# Patient Record
Sex: FEMALE | Race: WHITE | NOT HISPANIC OR LATINO | URBAN - METROPOLITAN AREA
[De-identification: names, ages, dates, MRNs, and addresses within clinical notes are randomized per-mention and may not be internally consistent; named-entity substitution may affect disease eponyms.]

---

## 2024-02-14 ENCOUNTER — INPATIENT (INPATIENT)
Facility: HOSPITAL | Age: 84
LOS: 8 days | Discharge: NURSING, REHAB OR LONG-TERM CARE | DRG: 871 | End: 2024-02-23
Attending: INTERNAL MEDICINE | Admitting: INTERNAL MEDICINE
Payer: MEDICARE

## 2024-02-14 VITALS
RESPIRATION RATE: 17 BRPM | SYSTOLIC BLOOD PRESSURE: 88 MMHG | WEIGHT: 99.65 LBS | HEART RATE: 71 BPM | DIASTOLIC BLOOD PRESSURE: 55 MMHG | OXYGEN SATURATION: 100 % | HEIGHT: 63 IN

## 2024-02-14 DIAGNOSIS — Z29.9 ENCOUNTER FOR PROPHYLACTIC MEASURES, UNSPECIFIED: ICD-10-CM

## 2024-02-14 DIAGNOSIS — R41.82 ALTERED MENTAL STATUS, UNSPECIFIED: ICD-10-CM

## 2024-02-14 DIAGNOSIS — G93.41 METABOLIC ENCEPHALOPATHY: ICD-10-CM

## 2024-02-14 DIAGNOSIS — N39.0 URINARY TRACT INFECTION, SITE NOT SPECIFIED: ICD-10-CM

## 2024-02-14 DIAGNOSIS — E78.5 HYPERLIPIDEMIA, UNSPECIFIED: ICD-10-CM

## 2024-02-14 DIAGNOSIS — I10 ESSENTIAL (PRIMARY) HYPERTENSION: ICD-10-CM

## 2024-02-14 DIAGNOSIS — E83.52 HYPERCALCEMIA: ICD-10-CM

## 2024-02-14 LAB
ALBUMIN SERPL ELPH-MCNC: 2.8 G/DL — LOW (ref 3.5–5)
ALP SERPL-CCNC: 84 U/L — SIGNIFICANT CHANGE UP (ref 40–120)
ALT FLD-CCNC: 25 U/L DA — SIGNIFICANT CHANGE UP (ref 10–60)
ANION GAP SERPL CALC-SCNC: 9 MMOL/L — SIGNIFICANT CHANGE UP (ref 5–17)
APPEARANCE UR: ABNORMAL
APTT BLD: 29.7 SEC — SIGNIFICANT CHANGE UP (ref 24.5–35.6)
AST SERPL-CCNC: 42 U/L — HIGH (ref 10–40)
BACTERIA # UR AUTO: ABNORMAL /HPF
BASOPHILS # BLD AUTO: 0.02 K/UL — SIGNIFICANT CHANGE UP (ref 0–0.2)
BASOPHILS NFR BLD AUTO: 0.3 % — SIGNIFICANT CHANGE UP (ref 0–2)
BILIRUB SERPL-MCNC: 0.4 MG/DL — SIGNIFICANT CHANGE UP (ref 0.2–1.2)
BILIRUB UR-MCNC: NEGATIVE — SIGNIFICANT CHANGE UP
BUN SERPL-MCNC: 26 MG/DL — HIGH (ref 7–18)
CALCIUM SERPL-MCNC: 9.8 MG/DL — SIGNIFICANT CHANGE UP (ref 8.4–10.5)
CHLORIDE SERPL-SCNC: 103 MMOL/L — SIGNIFICANT CHANGE UP (ref 96–108)
CO2 SERPL-SCNC: 24 MMOL/L — SIGNIFICANT CHANGE UP (ref 22–31)
COLOR SPEC: YELLOW — SIGNIFICANT CHANGE UP
CREAT SERPL-MCNC: 1.58 MG/DL — HIGH (ref 0.5–1.3)
DIFF PNL FLD: NEGATIVE — SIGNIFICANT CHANGE UP
EGFR: 32 ML/MIN/1.73M2 — LOW
EOSINOPHIL # BLD AUTO: 0.01 K/UL — SIGNIFICANT CHANGE UP (ref 0–0.5)
EOSINOPHIL NFR BLD AUTO: 0.1 % — SIGNIFICANT CHANGE UP (ref 0–6)
EPI CELLS # UR: PRESENT
FLUAV AG NPH QL: SIGNIFICANT CHANGE UP
FLUBV AG NPH QL: SIGNIFICANT CHANGE UP
GAS PNL BLDA: SIGNIFICANT CHANGE UP
GLUCOSE SERPL-MCNC: 118 MG/DL — HIGH (ref 70–99)
GLUCOSE UR QL: NEGATIVE MG/DL — SIGNIFICANT CHANGE UP
HCT VFR BLD CALC: 35.2 % — SIGNIFICANT CHANGE UP (ref 34.5–45)
HGB BLD-MCNC: 11.1 G/DL — LOW (ref 11.5–15.5)
IMM GRANULOCYTES NFR BLD AUTO: 0.5 % — SIGNIFICANT CHANGE UP (ref 0–0.9)
INR BLD: 0.94 RATIO — SIGNIFICANT CHANGE UP (ref 0.85–1.18)
KETONES UR-MCNC: NEGATIVE MG/DL — SIGNIFICANT CHANGE UP
LACTATE SERPL-SCNC: 3 MMOL/L — HIGH (ref 0.7–2)
LEUKOCYTE ESTERASE UR-ACNC: ABNORMAL
LYMPHOCYTES # BLD AUTO: 0.61 K/UL — LOW (ref 1–3.3)
LYMPHOCYTES # BLD AUTO: 7.6 % — LOW (ref 13–44)
MCHC RBC-ENTMCNC: 29.5 PG — SIGNIFICANT CHANGE UP (ref 27–34)
MCHC RBC-ENTMCNC: 31.5 GM/DL — LOW (ref 32–36)
MCV RBC AUTO: 93.6 FL — SIGNIFICANT CHANGE UP (ref 80–100)
MONOCYTES # BLD AUTO: 0.46 K/UL — SIGNIFICANT CHANGE UP (ref 0–0.9)
MONOCYTES NFR BLD AUTO: 5.8 % — SIGNIFICANT CHANGE UP (ref 2–14)
NEUTROPHILS # BLD AUTO: 6.84 K/UL — SIGNIFICANT CHANGE UP (ref 1.8–7.4)
NEUTROPHILS NFR BLD AUTO: 85.7 % — HIGH (ref 43–77)
NITRITE UR-MCNC: NEGATIVE — SIGNIFICANT CHANGE UP
NRBC # BLD: 0 /100 WBCS — SIGNIFICANT CHANGE UP (ref 0–0)
PH UR: 7.5 — SIGNIFICANT CHANGE UP (ref 5–8)
PLATELET # BLD AUTO: 293 K/UL — SIGNIFICANT CHANGE UP (ref 150–400)
POTASSIUM SERPL-MCNC: 4.8 MMOL/L — SIGNIFICANT CHANGE UP (ref 3.5–5.3)
POTASSIUM SERPL-SCNC: 4.8 MMOL/L — SIGNIFICANT CHANGE UP (ref 3.5–5.3)
PROT SERPL-MCNC: 7.5 G/DL — SIGNIFICANT CHANGE UP (ref 6–8.3)
PROT UR-MCNC: 30 MG/DL
PROTHROM AB SERPL-ACNC: 10.7 SEC — SIGNIFICANT CHANGE UP (ref 9.5–13)
RBC # BLD: 3.76 M/UL — LOW (ref 3.8–5.2)
RBC # FLD: 13.6 % — SIGNIFICANT CHANGE UP (ref 10.3–14.5)
RBC CASTS # UR COMP ASSIST: 10 /HPF — HIGH (ref 0–4)
SARS-COV-2 RNA SPEC QL NAA+PROBE: SIGNIFICANT CHANGE UP
SODIUM SERPL-SCNC: 136 MMOL/L — SIGNIFICANT CHANGE UP (ref 135–145)
SP GR SPEC: 1.01 — SIGNIFICANT CHANGE UP (ref 1–1.03)
TROPONIN I, HIGH SENSITIVITY RESULT: 13.5 NG/L — SIGNIFICANT CHANGE UP
UROBILINOGEN FLD QL: 0.2 MG/DL — SIGNIFICANT CHANGE UP (ref 0.2–1)
WBC # BLD: 7.98 K/UL — SIGNIFICANT CHANGE UP (ref 3.8–10.5)
WBC # FLD AUTO: 7.98 K/UL — SIGNIFICANT CHANGE UP (ref 3.8–10.5)
WBC UR QL: 55 /HPF — HIGH (ref 0–5)

## 2024-02-14 PROCEDURE — 99285 EMERGENCY DEPT VISIT HI MDM: CPT

## 2024-02-14 PROCEDURE — 70450 CT HEAD/BRAIN W/O DYE: CPT | Mod: 26,MA

## 2024-02-14 PROCEDURE — 71045 X-RAY EXAM CHEST 1 VIEW: CPT | Mod: 26

## 2024-02-14 RX ORDER — BACITRACIN ZINC 500 UNIT/G
1 OINTMENT IN PACKET (EA) TOPICAL
Refills: 0 | DISCHARGE

## 2024-02-14 RX ORDER — SODIUM CHLORIDE 9 MG/ML
1000 INJECTION, SOLUTION INTRAVENOUS
Refills: 0 | Status: DISCONTINUED | OUTPATIENT
Start: 2024-02-14 | End: 2024-02-19

## 2024-02-14 RX ORDER — LATANOPROST 0.05 MG/ML
1 SOLUTION/ DROPS OPHTHALMIC; TOPICAL AT BEDTIME
Refills: 0 | Status: DISCONTINUED | OUTPATIENT
Start: 2024-02-14 | End: 2024-02-23

## 2024-02-14 RX ORDER — ACETAMINOPHEN 500 MG
650 TABLET ORAL ONCE
Refills: 0 | Status: DISCONTINUED | OUTPATIENT
Start: 2024-02-14 | End: 2024-02-14

## 2024-02-14 RX ORDER — VANCOMYCIN HCL 1 G
750 VIAL (EA) INTRAVENOUS ONCE
Refills: 0 | Status: COMPLETED | OUTPATIENT
Start: 2024-02-14 | End: 2024-02-14

## 2024-02-14 RX ORDER — ONDANSETRON 8 MG/1
4 TABLET, FILM COATED ORAL EVERY 8 HOURS
Refills: 0 | Status: DISCONTINUED | OUTPATIENT
Start: 2024-02-14 | End: 2024-02-23

## 2024-02-14 RX ORDER — SENNA PLUS 8.6 MG/1
2 TABLET ORAL AT BEDTIME
Refills: 0 | Status: DISCONTINUED | OUTPATIENT
Start: 2024-02-14 | End: 2024-02-23

## 2024-02-14 RX ORDER — CHOLECALCIFEROL (VITAMIN D3) 125 MCG
1000 CAPSULE ORAL DAILY
Refills: 0 | Status: DISCONTINUED | OUTPATIENT
Start: 2024-02-14 | End: 2024-02-23

## 2024-02-14 RX ORDER — SODIUM CHLORIDE 9 MG/ML
2500 INJECTION INTRAMUSCULAR; INTRAVENOUS; SUBCUTANEOUS ONCE
Refills: 0 | Status: COMPLETED | OUTPATIENT
Start: 2024-02-14 | End: 2024-02-14

## 2024-02-14 RX ORDER — ATORVASTATIN CALCIUM 80 MG/1
20 TABLET, FILM COATED ORAL AT BEDTIME
Refills: 0 | Status: DISCONTINUED | OUTPATIENT
Start: 2024-02-14 | End: 2024-02-23

## 2024-02-14 RX ORDER — POLYETHYLENE GLYCOL 3350 17 G/17G
17 POWDER, FOR SOLUTION ORAL
Refills: 0 | DISCHARGE

## 2024-02-14 RX ORDER — SODIUM CHLORIDE 9 MG/ML
1000 INJECTION, SOLUTION INTRAVENOUS
Refills: 0 | Status: DISCONTINUED | OUTPATIENT
Start: 2024-02-14 | End: 2024-02-14

## 2024-02-14 RX ORDER — SENNA PLUS 8.6 MG/1
2 TABLET ORAL
Refills: 0 | DISCHARGE

## 2024-02-14 RX ORDER — PIPERACILLIN AND TAZOBACTAM 4; .5 G/20ML; G/20ML
3.38 INJECTION, POWDER, LYOPHILIZED, FOR SOLUTION INTRAVENOUS ONCE
Refills: 0 | Status: COMPLETED | OUTPATIENT
Start: 2024-02-14 | End: 2024-02-14

## 2024-02-14 RX ORDER — AMLODIPINE BESYLATE 2.5 MG/1
1 TABLET ORAL
Refills: 0 | DISCHARGE

## 2024-02-14 RX ORDER — FOLIC ACID 0.8 MG
1 TABLET ORAL DAILY
Refills: 0 | Status: DISCONTINUED | OUTPATIENT
Start: 2024-02-14 | End: 2024-02-23

## 2024-02-14 RX ORDER — ACETAMINOPHEN 500 MG
650 TABLET ORAL EVERY 6 HOURS
Refills: 0 | Status: DISCONTINUED | OUTPATIENT
Start: 2024-02-14 | End: 2024-02-23

## 2024-02-14 RX ORDER — ENOXAPARIN SODIUM 100 MG/ML
30 INJECTION SUBCUTANEOUS EVERY 24 HOURS
Refills: 0 | Status: DISCONTINUED | OUTPATIENT
Start: 2024-02-14 | End: 2024-02-23

## 2024-02-14 RX ORDER — CEFTRIAXONE 500 MG/1
1000 INJECTION, POWDER, FOR SOLUTION INTRAMUSCULAR; INTRAVENOUS EVERY 24 HOURS
Refills: 0 | Status: COMPLETED | OUTPATIENT
Start: 2024-02-15 | End: 2024-02-17

## 2024-02-14 RX ORDER — BACITRACIN ZINC 500 UNIT/G
1 OINTMENT IN PACKET (EA) TOPICAL
Refills: 0 | Status: DISCONTINUED | OUTPATIENT
Start: 2024-02-14 | End: 2024-02-23

## 2024-02-14 RX ORDER — ATORVASTATIN CALCIUM 80 MG/1
1 TABLET, FILM COATED ORAL
Refills: 0 | DISCHARGE

## 2024-02-14 RX ORDER — ACETAMINOPHEN 500 MG
2 TABLET ORAL
Refills: 0 | DISCHARGE

## 2024-02-14 RX ORDER — POLYETHYLENE GLYCOL 3350 17 G/17G
17 POWDER, FOR SOLUTION ORAL DAILY
Refills: 0 | Status: DISCONTINUED | OUTPATIENT
Start: 2024-02-14 | End: 2024-02-23

## 2024-02-14 RX ADMIN — SODIUM CHLORIDE 2500 MILLILITER(S): 9 INJECTION INTRAMUSCULAR; INTRAVENOUS; SUBCUTANEOUS at 19:15

## 2024-02-14 RX ADMIN — PIPERACILLIN AND TAZOBACTAM 3.38 GRAM(S): 4; .5 INJECTION, POWDER, LYOPHILIZED, FOR SOLUTION INTRAVENOUS at 19:15

## 2024-02-14 RX ADMIN — SODIUM CHLORIDE 2500 MILLILITER(S): 9 INJECTION INTRAMUSCULAR; INTRAVENOUS; SUBCUTANEOUS at 17:06

## 2024-02-14 RX ADMIN — Medication 750 MILLIGRAM(S): at 19:15

## 2024-02-14 RX ADMIN — PIPERACILLIN AND TAZOBACTAM 200 GRAM(S): 4; .5 INJECTION, POWDER, LYOPHILIZED, FOR SOLUTION INTRAVENOUS at 17:06

## 2024-02-14 RX ADMIN — Medication 250 MILLIGRAM(S): at 17:50

## 2024-02-14 NOTE — ED ADULT NURSE NOTE - OBJECTIVE STATEMENT
pt is here for altered mental status.  BIBA, sending from NH for altered mental status, non-verbal status at this time, b/l lower arms bruises and skin tears from NH, redness to sacrum area,

## 2024-02-14 NOTE — H&P ADULT - PROBLEM SELECTOR PLAN 3
- Mildly elevated corrected calcium - 10.8.   - Start IV fluids.   - F/U PTH. - Mildly elevated corrected calcium - 10.8.   - Start IV fluids.   - F/U PTH, PTHrp, Vitamin D.

## 2024-02-14 NOTE — H&P ADULT - PROBLEM SELECTOR PLAN 2
- P/w lethargy and found to be minimally responsive.   - Presented with hypotension (88/55) which resolved with IV fluids.   - UA +ve.   - S/p zosyn and vancomycin in ED.   - s/p 2.5L in ED.   - Start ceftriaxone.   - C/w IV fluids.   - F/U Ucx.   - F/U Bcx. - P/w lethargy and found to be minimally responsive.   - Presented with hypotension (88/55) which resolved with IV fluids.   - UA +ve.   - S/p zosyn and vancomycin in ED.   - s/p 2.5L in ED.   - Start ceftriaxone.   - C/w IV fluids.   - F/U Ucx.   - F/U Bcx.  - Consulted ID - Dr Mejia

## 2024-02-14 NOTE — ED PROVIDER NOTE - OBJECTIVE STATEMENT
83 F PMHx Dementia BIBEMS from NH for AMS. 83-year-old female with a past medical history of dementia presents to the ED brought in by EMS for change in mental status.  Patient at nursing home had change in baseline normally spontaneously awake found to be somnolent not eating.  On arrival blood pressure 88/55 in the setting of altered mental status code sepsis activated.    .GENERALIZED APPEARANCE:  No obvious signs of acute distress.  Non-verbal due to AMS.   SKIN:  Warm, dry, normal color for race, no rashes, no cyanosis.  HEENMT: Atraumatic. PERRLA. EOMI. No JVD. No lymphadenopathy.  RESPIRATORY:  Clear to auscultation B/L, adequate air entry bilaterally, symmetrical chest expansion. No obvious respiratory distress.   CARDIOVASCULAR:  Regular rate, no obvious murmur.  GI:  Soft, non-tender, non-distended.  No rebound, no guarding.  EXTREMITIES:  No deformity, edema, or calf tenderness. Pulses intact x4.  NEURO: AAOx0. No focal deficits. Somnolent. Unresponsive with spontaneous respirations.

## 2024-02-14 NOTE — H&P ADULT - HISTORY OF PRESENT ILLNESS
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 dementia (AAOx1-2 at baseline) who was sent by NH for altered mental status. Patient is usually interactive and talking but this morning patient was found to be lethargic and minimally interactive. Due to this patient was sent to the hospital. Patient was seen at bedside but patient is minimally responsive and only making grunting sounds to painful stimuli. No ROS could be obtained from the patient due to altered mental status.  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 dementia (AAOx1-2 at baseline) who was sent by NH for altered mental status. Patient is usually interactive and talking but this morning patient was found to be lethargic and minimally interactive. Due to this patient was sent to the hospital. Patient was seen at bedside but patient is minimally responsive and only making grunting sounds to painful stimuli. No ROS could be obtained from the patient due to altered mental status.

## 2024-02-14 NOTE — ED PROVIDER NOTE - CLINICAL SUMMARY MEDICAL DECISION MAKING FREE TEXT BOX
This patient presents with altered mental status, concerning for _. Labs and exam were inconsistent with toxic metabolic etiologies such as electrolyte disturbances (Na/Ca), hypoglycemia, and uremia; acidosis states, infection (i.e. Sepsis). History and exam make toxidromes of intoxication or withdrawal, hypoxemia or hypercarbia, liver disease or failure causing hepatic encephalopathy, endocrine emergencies (hyper/hypothyroidism, adrenal insufficiency), seizure, trauma, intracranial bleeds or ischemic stroke less likely_.    Presume medical neurocognitive cause such as dementia, delirium, drug/alcohol induced.    Will obtain medical workup and discuss with social work to try to obtain collateral information.    Given History, Physical, and Workup there is no overt concern for a dangerous emergent cause such as, but not limited to, CNS infection, severe Toxidrome, severe metabolic derangement, or stroke.    Disposition: Admit; the patient is suffering altered mental status that is persistent and therefore they will be admitted.

## 2024-02-14 NOTE — H&P ADULT - NSHPPHYSICALEXAM_GEN_ALL_CORE
PHYSICAL EXAM:  GENERAL: lethargic, only making grunting sounds with painful stimuli.   HEAD:  Atraumatic, Normocephalic  EYES: constricted pupils that are minimally reactive.   NECK: Supple  CHEST/LUNG: Clear to auscultation bilaterally; No wheeze; No crackles; No accessory muscles used  HEART: Regular rate and rhythm; No murmurs;   ABDOMEN: Soft, Nontender, Nondistended; Bowel sounds present; No guarding  EXTREMITIES:  2+ Peripheral Pulses, No edema  PSYCH: AAOx0  NEUROLOGY: lethargic, only making grunting sounds with painful stimuli. Could not do thorough neuro exam as patient can not follow commands.   SKIN: No rashes or lesions

## 2024-02-14 NOTE — H&P ADULT - PROBLEM SELECTOR PLAN 1
- P/w lethargy and found to be minimally responsive.   - CT head: chronic ischemic changes with atrophy, most prevalent in the frontal temporal region.. No CT evidence of an acute infarct, hemorrhage, or mass.  - Normal Na, K, blood glucose and PCO2.   - Mildly elevated corrected calcium - 10.8.   - UA +ve.   - Likely AMS 2/2 to UTI vs hypercalcemia.   - C/w IV fluids, treat underlying infection.   - F/U Utox.   - Consult neurology in AM. - P/w lethargy and found to be minimally responsive.   - CT head: chronic ischemic changes with atrophy, most prevalent in the frontal temporal region.. No CT evidence of an acute infarct, hemorrhage, or mass.  - Normal Na, K, blood glucose and PCO2.   - Mildly elevated corrected calcium - 10.8.   - UA +ve.   - Likely AMS 2/2 to UTI vs hypercalcemia.   - C/w IV fluids, treat underlying infection.   - F/U Utox.   - Consulted neurology - Dr. Thornton

## 2024-02-14 NOTE — H&P ADULT - ASSESSMENT
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 dementia (AAOx1-2 at baseline) who was sent by NH for altered mental status. Patient tested positive for a UTI. Patient is being admitted for acute encephalopathy 2/2 to UTI.

## 2024-02-14 NOTE — H&P ADULT - PROBLEM SELECTOR PLAN 4
- Takes amlodipine at home.   - Will hold in setting of hypotension.   - Can resume once BP improves.

## 2024-02-15 LAB
24R-OH-CALCIDIOL SERPL-MCNC: 28.1 NG/ML — LOW (ref 30–80)
AMPHET UR-MCNC: NEGATIVE — SIGNIFICANT CHANGE UP
ANION GAP SERPL CALC-SCNC: 7 MMOL/L — SIGNIFICANT CHANGE UP (ref 5–17)
BARBITURATES UR SCN-MCNC: NEGATIVE — SIGNIFICANT CHANGE UP
BENZODIAZ UR-MCNC: NEGATIVE — SIGNIFICANT CHANGE UP
BUN SERPL-MCNC: 17 MG/DL — SIGNIFICANT CHANGE UP (ref 7–18)
CALCIUM SERPL-MCNC: 8.9 MG/DL — SIGNIFICANT CHANGE UP (ref 8.4–10.5)
CALCIUM SERPL-MCNC: 8.9 MG/DL — SIGNIFICANT CHANGE UP (ref 8.4–10.5)
CHLORIDE SERPL-SCNC: 115 MMOL/L — HIGH (ref 96–108)
CO2 SERPL-SCNC: 23 MMOL/L — SIGNIFICANT CHANGE UP (ref 22–31)
COCAINE METAB.OTHER UR-MCNC: NEGATIVE — SIGNIFICANT CHANGE UP
CREAT SERPL-MCNC: 0.89 MG/DL — SIGNIFICANT CHANGE UP (ref 0.5–1.3)
EGFR: 64 ML/MIN/1.73M2 — SIGNIFICANT CHANGE UP
GLUCOSE SERPL-MCNC: 92 MG/DL — SIGNIFICANT CHANGE UP (ref 70–99)
HCT VFR BLD CALC: 33.2 % — LOW (ref 34.5–45)
HGB BLD-MCNC: 10.2 G/DL — LOW (ref 11.5–15.5)
MAGNESIUM SERPL-MCNC: 2 MG/DL — SIGNIFICANT CHANGE UP (ref 1.6–2.6)
MCHC RBC-ENTMCNC: 29 PG — SIGNIFICANT CHANGE UP (ref 27–34)
MCHC RBC-ENTMCNC: 30.7 GM/DL — LOW (ref 32–36)
MCV RBC AUTO: 94.3 FL — SIGNIFICANT CHANGE UP (ref 80–100)
METHADONE UR-MCNC: NEGATIVE — SIGNIFICANT CHANGE UP
NRBC # BLD: 0 /100 WBCS — SIGNIFICANT CHANGE UP (ref 0–0)
OPIATES UR-MCNC: NEGATIVE — SIGNIFICANT CHANGE UP
PCP SPEC-MCNC: SIGNIFICANT CHANGE UP
PCP UR-MCNC: NEGATIVE — SIGNIFICANT CHANGE UP
PHOSPHATE SERPL-MCNC: 3.3 MG/DL — SIGNIFICANT CHANGE UP (ref 2.5–4.5)
PLATELET # BLD AUTO: 213 K/UL — SIGNIFICANT CHANGE UP (ref 150–400)
POTASSIUM SERPL-MCNC: 4 MMOL/L — SIGNIFICANT CHANGE UP (ref 3.5–5.3)
POTASSIUM SERPL-SCNC: 4 MMOL/L — SIGNIFICANT CHANGE UP (ref 3.5–5.3)
PTH-INTACT FLD-MCNC: 38 PG/ML — SIGNIFICANT CHANGE UP (ref 15–65)
RBC # BLD: 3.52 M/UL — LOW (ref 3.8–5.2)
RBC # FLD: 13.8 % — SIGNIFICANT CHANGE UP (ref 10.3–14.5)
SODIUM SERPL-SCNC: 145 MMOL/L — SIGNIFICANT CHANGE UP (ref 135–145)
THC UR QL: NEGATIVE — SIGNIFICANT CHANGE UP
VIT D25+D1,25 OH+D1,25 PNL SERPL-MCNC: 33.9 PG/ML — SIGNIFICANT CHANGE UP (ref 19.9–79.3)
WBC # BLD: 6.88 K/UL — SIGNIFICANT CHANGE UP (ref 3.8–10.5)
WBC # FLD AUTO: 6.88 K/UL — SIGNIFICANT CHANGE UP (ref 3.8–10.5)

## 2024-02-15 RX ORDER — AMLODIPINE BESYLATE 2.5 MG/1
10 TABLET ORAL DAILY
Refills: 0 | Status: DISCONTINUED | OUTPATIENT
Start: 2024-02-16 | End: 2024-02-23

## 2024-02-15 RX ORDER — AMLODIPINE BESYLATE 2.5 MG/1
10 TABLET ORAL ONCE
Refills: 0 | Status: COMPLETED | OUTPATIENT
Start: 2024-02-15 | End: 2024-02-15

## 2024-02-15 RX ADMIN — Medication 1 APPLICATION(S): at 05:30

## 2024-02-15 RX ADMIN — LATANOPROST 1 DROP(S): 0.05 SOLUTION/ DROPS OPHTHALMIC; TOPICAL at 22:07

## 2024-02-15 RX ADMIN — Medication 1 APPLICATION(S): at 19:18

## 2024-02-15 RX ADMIN — SODIUM CHLORIDE 100 MILLILITER(S): 9 INJECTION, SOLUTION INTRAVENOUS at 06:01

## 2024-02-15 RX ADMIN — ATORVASTATIN CALCIUM 20 MILLIGRAM(S): 80 TABLET, FILM COATED ORAL at 01:05

## 2024-02-15 RX ADMIN — SENNA PLUS 2 TABLET(S): 8.6 TABLET ORAL at 01:05

## 2024-02-15 RX ADMIN — CEFTRIAXONE 100 MILLIGRAM(S): 500 INJECTION, POWDER, FOR SOLUTION INTRAMUSCULAR; INTRAVENOUS at 06:09

## 2024-02-15 RX ADMIN — ENOXAPARIN SODIUM 30 MILLIGRAM(S): 100 INJECTION SUBCUTANEOUS at 05:30

## 2024-02-15 NOTE — ADVANCED PRACTICE NURSE CONSULT - ASSESSMENT
This is a 83yr old female patient admitted for Altered Mental Status, presenting with the following:  -There is a Stage 1 Pressure Injury to the Bilateral Heels, as evident by non-blanchable erythema  -There is a Stage 2 Pressure Injury to the Coccyx (1cm x 0.5cm x 0.1cm) with pink tissue and scant drainage  -There is a Skin Tear (x2) to the R. Upper Extremity with red tissue, drainage, and surrounding tissue ecchymosis  -There is evidence of Bilateral Upper Extremity ecchymosis

## 2024-02-15 NOTE — PROGRESS NOTE ADULT - PROBLEM SELECTOR PLAN 1
- P/w lethargy and found to be minimally responsive.   - CT head: chronic ischemic changes with atrophy, most prevalent in the frontal temporal region.. No CT evidence of an acute infarct, hemorrhage, or mass.  - Normal Na, K, blood glucose and PCO2.   - Mildly elevated corrected calcium - 10.8.   - UA +ve.   - Likely AMS 2/2 to UTI vs hypercalcemia.   - C/w IV fluids, treat underlying infection.   neurology - Dr. Thornton

## 2024-02-15 NOTE — PATIENT PROFILE ADULT - FALL HARM RISK - HARM RISK INTERVENTIONS
Assistance with ambulation/Assistance OOB with selected safe patient handling equipment/Communicate Risk of Fall with Harm to all staff/Monitor for mental status changes/Monitor gait and stability/Provide patient with walking aids - walker, cane, crutches/Reinforce activity limits and safety measures with patient and family/Reorient to person, place and time as needed/Review medications for side effects contributing to fall risk/Sit up slowly, dangle for a short time, stand at bedside before walking/Tailored Fall Risk Interventions/Toileting schedule using arm’s reach rule for commode and bathroom/Use of alarms - bed, chair and/or voice tab/Visual Cue: Yellow wristband and red socks/Bed in lowest position, wheels locked, appropriate side rails in place/Call bell, personal items and telephone in reach/Instruct patient to call for assistance before getting out of bed or chair/Non-slip footwear when patient is out of bed/Branch to call system/Physically safe environment - no spills, clutter or unnecessary equipment/Purposeful Proactive Rounding/Room/bathroom lighting operational, light cord in reach

## 2024-02-15 NOTE — ADVANCED PRACTICE NURSE CONSULT - RECOMMEDATIONS
-Clean all wounds with normal saline and apply skin prep to the surrounding skin  -Apply Xeroform gauze to the R. Upper Extremity wounds, cover with an ABD pad, wrap with Kerlex, and secure with tape Daily PRN  -Apply a Foam dressing to the Coccyx area Q 72hrs PRN  -Elevate/float the patients heels using heel protectors and reposition the patient Q 2hrs using wedges or pillows

## 2024-02-15 NOTE — PATIENT PROFILE ADULT - INFORMATION COULD NOT BE OBTAINED DETAILS
· Blood pressure well controlled  · Continue metoprolol  · Hold lisinopril  · Continue to monitor AMS

## 2024-02-15 NOTE — PROGRESS NOTE ADULT - SUBJECTIVE AND OBJECTIVE BOX
NP Note discussed with  Primary Attending    Patient is a 83y old  Female who presents with a chief complaint of AMS. (14 Feb 2024 21:33)      INTERVAL HPI/OVERNIGHT EVENTS: no new complaints    MEDICATIONS  (STANDING):  atorvastatin 20 milliGRAM(s) Oral at bedtime  bacitracin   Ointment 1 Application(s) Topical two times a day  cefTRIAXone   IVPB 1000 milliGRAM(s) IV Intermittent every 24 hours  cholecalciferol 1000 Unit(s) Oral daily  enoxaparin Injectable 30 milliGRAM(s) SubCutaneous every 24 hours  folic acid 1 milliGRAM(s) Oral daily  lactated ringers. 1000 milliLiter(s) (100 mL/Hr) IV Continuous <Continuous>  latanoprost 0.005% Ophthalmic Solution 1 Drop(s) Both EYES at bedtime  multivitamin 1 Tablet(s) Oral daily  polyethylene glycol 3350 17 Gram(s) Oral daily  senna 2 Tablet(s) Oral at bedtime    MEDICATIONS  (PRN):  acetaminophen     Tablet .. 650 milliGRAM(s) Oral every 6 hours PRN Temp greater or equal to 38C (100.4F), Mild Pain (1 - 3)  ondansetron Injectable 4 milliGRAM(s) IV Push every 8 hours PRN Nausea and/or Vomiting      __________________________________________________  REVIEW OF SYSTEMS:    CONSTITUTIONAL: No fever,   EYES: no acute visual disturbances  NECK: No pain or stiffness  RESPIRATORY: No cough; No shortness of breath  CARDIOVASCULAR: No chest pain, no palpitations  GASTROINTESTINAL: No pain. No nausea or vomiting; No diarrhea   NEUROLOGICAL: No headache or numbness, no tremors  MUSCULOSKELETAL: No joint pain, no muscle pain  GENITOURINARY: no dysuria, no frequency, no hesitancy  PSYCHIATRY: no depression , no anxiety  ALL OTHER  ROS negative        Vital Signs Last 24 Hrs  T(C): 37.1 (15 Feb 2024 07:51), Max: 37.1 (14 Feb 2024 18:35)  T(F): 98.8 (15 Feb 2024 07:51), Max: 98.8 (14 Feb 2024 18:35)  HR: 74 (15 Feb 2024 07:51) (71 - 93)  BP: 128/60 (15 Feb 2024 07:51) (88/55 - 128/60)  BP(mean): --  RR: 16 (15 Feb 2024 07:51) (14 - 18)  SpO2: 100% (15 Feb 2024 07:51) (100% - 100%)    Parameters below as of 15 Feb 2024 07:51  Patient On (Oxygen Delivery Method): nasal cannula  O2 Flow (L/min): 3      ________________________________________________  PHYSICAL EXAM:  GENERAL: NAD  HEENT: Normocephalic;  conjunctivae and sclerae clear; moist mucous membranes;   NECK : supple  CHEST/LUNG: Clear to auscultation bilaterally with good air entry   HEART: S1 S2  regular; no murmurs, gallops or rubs  ABDOMEN: Soft, Nontender, Nondistended; Bowel sounds present  EXTREMITIES: no cyanosis; no edema; no calf tenderness  SKIN: warm and dry; no rash  NERVOUS SYSTEM:  Awake and alert; Oriented  to place, person and time ; no new deficits    _________________________________________________  LABS:                        10.2   6.88  )-----------( 213      ( 15 Feb 2024 06:10 )             33.2     02-15    145  |  115<H>  |  17  ----------------------------<  92  4.0   |  23  |  0.89    Ca    8.9      15 Feb 2024 06:10  Phos  3.3     02-15  Mg     2.0     02-15    TPro  7.5  /  Alb  2.8<L>  /  TBili  0.4  /  DBili  x   /  AST  42<H>  /  ALT  25  /  AlkPhos  84  02-14    PT/INR - ( 14 Feb 2024 16:30 )   PT: 10.7 sec;   INR: 0.94 ratio         PTT - ( 14 Feb 2024 16:30 )  PTT:29.7 sec  Urinalysis Basic - ( 15 Feb 2024 06:10 )    Color: x / Appearance: x / SG: x / pH: x  Gluc: 92 mg/dL / Ketone: x  / Bili: x / Urobili: x   Blood: x / Protein: x / Nitrite: x   Leuk Esterase: x / RBC: x / WBC x   Sq Epi: x / Non Sq Epi: x / Bacteria: x      CAPILLARY BLOOD GLUCOSE      POCT Blood Glucose.: 162 mg/dL (14 Feb 2024 16:34)        RADIOLOGY & ADDITIONAL TESTS:    Imaging  Reviewed:  YES/NO    Consultant(s) Notes Reviewed:   YES/ No      Plan of care was discussed with patient and /or primary care giver; all questions and concerns were addressed  NP Note discussed with  Primary Attending    Patient is a 83y old  Female who presents with a chief complaint of AMS. (14 Feb 2024 21:33)      INTERVAL HPI/OVERNIGHT EVENTS: no new complaints    MEDICATIONS  (STANDING):  atorvastatin 20 milliGRAM(s) Oral at bedtime  bacitracin   Ointment 1 Application(s) Topical two times a day  cefTRIAXone   IVPB 1000 milliGRAM(s) IV Intermittent every 24 hours  cholecalciferol 1000 Unit(s) Oral daily  enoxaparin Injectable 30 milliGRAM(s) SubCutaneous every 24 hours  folic acid 1 milliGRAM(s) Oral daily  lactated ringers. 1000 milliLiter(s) (100 mL/Hr) IV Continuous <Continuous>  latanoprost 0.005% Ophthalmic Solution 1 Drop(s) Both EYES at bedtime  multivitamin 1 Tablet(s) Oral daily  polyethylene glycol 3350 17 Gram(s) Oral daily  senna 2 Tablet(s) Oral at bedtime    MEDICATIONS  (PRN):  acetaminophen     Tablet .. 650 milliGRAM(s) Oral every 6 hours PRN Temp greater or equal to 38C (100.4F), Mild Pain (1 - 3)  ondansetron Injectable 4 milliGRAM(s) IV Push every 8 hours PRN Nausea and/or Vomiting      __________________________________________________  REVIEW OF SYSTEMS:    poor historian unable to assess      Vital Signs Last 24 Hrs  T(C): 37.1 (15 Feb 2024 07:51), Max: 37.1 (14 Feb 2024 18:35)  T(F): 98.8 (15 Feb 2024 07:51), Max: 98.8 (14 Feb 2024 18:35)  HR: 74 (15 Feb 2024 07:51) (71 - 93)  BP: 128/60 (15 Feb 2024 07:51) (88/55 - 128/60)  BP(mean): --  RR: 16 (15 Feb 2024 07:51) (14 - 18)  SpO2: 100% (15 Feb 2024 07:51) (100% - 100%)    Parameters below as of 15 Feb 2024 07:51  Patient On (Oxygen Delivery Method): nasal cannula  O2 Flow (L/min): 3      ________________________________________________  PHYSICAL EXAM:  GENERAL: NAD  HEENT: Normocephalic;  conjunctivae and sclerae clear; moist mucous membranes;   NECK : supple  CHEST/LUNG: bibasilar crackles, mild, with good air entry   HEART: S1 S2  regular; no murmurs, gallops or rubs  ABDOMEN: Soft, Nontender, Nondistended; Bowel sounds present  EXTREMITIES: no cyanosis; no edema; no calf tenderness  SKIN: pressure injury stage 1 bilateral heel, stage 2 coccyx, skin tear  NERVOUS SYSTEM:  Awake and alert; Oriented  to place, confused    _________________________________________________  LABS:                        10.2   6.88  )-----------( 213      ( 15 Feb 2024 06:10 )             33.2     02-15    145  |  115<H>  |  17  ----------------------------<  92  4.0   |  23  |  0.89    Ca    8.9      15 Feb 2024 06:10  Phos  3.3     02-15  Mg     2.0     02-15    TPro  7.5  /  Alb  2.8<L>  /  TBili  0.4  /  DBili  x   /  AST  42<H>  /  ALT  25  /  AlkPhos  84  02-14    PT/INR - ( 14 Feb 2024 16:30 )   PT: 10.7 sec;   INR: 0.94 ratio         PTT - ( 14 Feb 2024 16:30 )  PTT:29.7 sec  Urinalysis Basic - ( 15 Feb 2024 06:10 )    Color: x / Appearance: x / SG: x / pH: x  Gluc: 92 mg/dL / Ketone: x  / Bili: x / Urobili: x   Blood: x / Protein: x / Nitrite: x   Leuk Esterase: x / RBC: x / WBC x   Sq Epi: x / Non Sq Epi: x / Bacteria: x      CAPILLARY BLOOD GLUCOSE      POCT Blood Glucose.: 162 mg/dL (14 Feb 2024 16:34)        RADIOLOGY & ADDITIONAL TESTS:  < from: CT Head No Cont (02.14.24 @ 17:29) >  ACC: [id number] EXAM:  CT BRAIN   ORDERED BY: MAX LAZARUS     PROCEDURE DATE:  02/14/2024          INTERPRETATION:  INDICATION:  Altered mental status.  TECHNIQUE:  A non contrast thin section axial CT study of the brain was   performed from skull base to vertex. Coronal and sagittal reformations   were generated from the axial data.  COMPARISON EXAMINATION:  No prior    FINDINGS:    HEMISPHERES:  Chronic ischemic changes are noted in the white matter of   both hemispheres with atrophy. This is more prominent on the left. There   is no CT evidence of an acute infarct, hemorrhage, or mass. There is   moderate to severe temporal lobe atrophy with moderate frontal volume   loss.  VENTRICLES:  Midline with ex vacuo enlargement  POSTERIOR FOSSA:  The brain stem and cerebellum are unremarkable.  No CP   angle lesion noted.  EXTRACEREBRAL SPACES:  No subdural or epidural collections are noted.  SKULL BASE AND CALVARIUM:  Appears intact.  No fracture or destructive   lesion is identified.  SINUSES AND MASTOIDS:  Clear.  MISCELLANEOUS:  No orbital or suprasellar abnormality noted.    IMPRESSION:    1)  chronic ischemic changes with atrophy, most prevalent in the frontal   temporal region.. No CT evidence of an acute infarct, hemorrhage, or mass.  2)  clear sinuses and mastoids..    Follow-up MR imaging of the brain may be considered for further   assessment.    --- End of Report ---      < end of copied text >  < from: Xray Chest 1 View- PORTABLE-Urgent (02.14.24 @ 16:52) >  ACC: [id number] EXAM:  XR CHEST PORTABLE URGENT 1V   ORDERED BY: MAX   LAZARUS     PROCEDURE DATE:  02/14/2024          INTERPRETATION:  Portable AP chest radiographs    COMPARISON:  NONE.    CLINICAL INFORMATION: Sepsis.    FINDINGS:  CATHETERS AND TUBES: None    PULMONARY: The airway is midline.  There are no airspace consolidations or radiographic evidence of   pulmonary nodules..  No pleural effusion or pneumothorax.    HEART/VASCULAR: The heart size and mediastinum configuration are within   the limits of normal.    BONES: The visualized osseous thorax is intact.    IMPRESSION:    No radiographic evidence of active chest disease..    --- End of Report ---    < end of copied text >    Imaging  Reviewed:  YES    Consultant(s) Notes Reviewed:   YES    Plan of care was discussed with patient and /or primary care giver; all questions and concerns were addressed

## 2024-02-15 NOTE — PROGRESS NOTE ADULT - SUBJECTIVE AND OBJECTIVE BOX
Patient is a 83y old  Female who presents with a chief complaint of AMS. (15 Feb 2024 11:02)    PATIENT IS SEEN AND EXAMINED IN MEDICAL FLOOR.      ALLERGIES:  No Known Allergies      VITALS:    Vital Signs Last 24 Hrs  T(C): 36.7 (15 Feb 2024 21:30), Max: 37.1 (15 Feb 2024 07:51)  T(F): 98 (15 Feb 2024 21:30), Max: 98.8 (15 Feb 2024 07:51)  HR: 125 (15 Feb 2024 21:30) (74 - 125)  BP: 196/89 (15 Feb 2024 21:30) (110/78 - 196/89)  BP(mean): --  RR: 16 (15 Feb 2024 21:30) (14 - 18)  SpO2: 97% (15 Feb 2024 21:30) (93% - 100%)    Parameters below as of 15 Feb 2024 21:30  Patient On (Oxygen Delivery Method): nasal cannula  O2 Flow (L/min): 3      LABS:    CBC Full  -  ( 15 Feb 2024 06:10 )  WBC Count : 6.88 K/uL  RBC Count : 3.52 M/uL  Hemoglobin : 10.2 g/dL  Hematocrit : 33.2 %  Platelet Count - Automated : 213 K/uL  Mean Cell Volume : 94.3 fl  Mean Cell Hemoglobin : 29.0 pg  Mean Cell Hemoglobin Concentration : 30.7 gm/dL  Auto Neutrophil # : x  Auto Lymphocyte # : x  Auto Monocyte # : x  Auto Eosinophil # : x  Auto Basophil # : x  Auto Neutrophil % : x  Auto Lymphocyte % : x  Auto Monocyte % : x  Auto Eosinophil % : x  Auto Basophil % : x    PT/INR - ( 14 Feb 2024 16:30 )   PT: 10.7 sec;   INR: 0.94 ratio         PTT - ( 14 Feb 2024 16:30 )  PTT:29.7 sec  02-15    145  |  115<H>  |  17  ----------------------------<  92  4.0   |  23  |  0.89    Ca    8.9      15 Feb 2024 06:10  Phos  3.3     02-15  Mg     2.0     02-15    TPro  7.5  /  Alb  2.8<L>  /  TBili  0.4  /  DBili  x   /  AST  42<H>  /  ALT  25  /  AlkPhos  84  02-14    CAPILLARY BLOOD GLUCOSE            LIVER FUNCTIONS - ( 14 Feb 2024 16:30 )  Alb: 2.8 g/dL / Pro: 7.5 g/dL / ALK PHOS: 84 U/L / ALT: 25 U/L DA / AST: 42 U/L / GGT: x           Creatinine Trend: 0.89<--, 1.58<--  I&O's Summary      ABG - ( 14 Feb 2024 21:43 )  pH, Arterial: 7.41  pH, Blood: x     /  pCO2: 31    /  pO2: 125   / HCO3: 20    / Base Excess: -4.3  /  SaO2: 99                  .Blood Blood-Peripheral  02-14 @ 16:45   No growth at 24 hours  --  --      .Blood Blood-Peripheral  02-14 @ 16:30   No growth at 24 hours  --  --          MEDICATIONS:    MEDICATIONS  (STANDING):  amLODIPine   Tablet 10 milliGRAM(s) Oral once  atorvastatin 20 milliGRAM(s) Oral at bedtime  bacitracin   Ointment 1 Application(s) Topical two times a day  cefTRIAXone   IVPB 1000 milliGRAM(s) IV Intermittent every 24 hours  cholecalciferol 1000 Unit(s) Oral daily  enoxaparin Injectable 30 milliGRAM(s) SubCutaneous every 24 hours  folic acid 1 milliGRAM(s) Oral daily  lactated ringers. 1000 milliLiter(s) (100 mL/Hr) IV Continuous <Continuous>  latanoprost 0.005% Ophthalmic Solution 1 Drop(s) Both EYES at bedtime  multivitamin 1 Tablet(s) Oral daily  polyethylene glycol 3350 17 Gram(s) Oral daily  senna 2 Tablet(s) Oral at bedtime      MEDICATIONS  (PRN):  acetaminophen     Tablet .. 650 milliGRAM(s) Oral every 6 hours PRN Temp greater or equal to 38C (100.4F), Mild Pain (1 - 3)  ondansetron Injectable 4 milliGRAM(s) IV Push every 8 hours PRN Nausea and/or Vomiting      REVIEW OF SYSTEMS:                           ALL ROS DONE [ X   ]    CONSTITUTIONAL:  LETHARGIC [   ], FEVER [   ], UNRESPONSIVE [   ]  CVS:  CP  [   ], SOB, [   ], PALPITATIONS [   ], DIZZYNESS [   ]  RS: COUGH [   ], SPUTUM [   ]  GI: ABDOMINAL PAIN [   ], NAUSEA [   ], VOMITINGS [   ], DIARRHEA [   ], CONSTIPATION [   ]  :  DYSURIA [   ], NOCTURIA [   ], INCREASED FREQUENCY [   ], DRIBLING [   ],  SKELETAL: PAINFUL JOINTS [   ], SWOLLEN JOINTS [   ], NECK ACHE [   ], LOW BACK ACHE [   ],  SKIN : ULCERS [   ], RASH [   ], ITCHING [   ]  CNS: HEAD ACHE [   ], DOUBLE VISION [   ], BLURRED VISION [   ], AMS / CONFUSION [   ], SEIZURES [   ], WEAKNESS [   ],TINGLING / NUMBNESS [   ]    PHYSICAL EXAMINATION:  GENERAL APPEARANCE: NO DISTRESS  HEENT:  NO PALLOR, NO  JVD,  NO   NODES, NECK SUPPLE  CVS: S1 +, S2 +,   RS: AEEB,  OCCASIONAL  RALES +,   NO RONCHI  ABD: SOFT, NT, NO, BS +  EXT: NO PE  SKIN: WARM,   SKELETAL:  ROM ACCEPTABLE [ WHEN EXAMINER MOVES ARMS/LEGS, BUT IS NOT CONSISTENTLY PARTICIPATING IN EXAM - ?DUE TO DEMENTIA]  CNS:  AAO X 1    RADIOLOGY :    RADIOLOGY AND READINGS REVIEWED    ASSESSMENT :     Altered mental status    Hypertension    Hyperlipidemia    Dementia    Pulmonary embolism        PLAN:  HPI:  Patient is a 84 y/o F who is legally blind and totally dependent for ADLS with PMH of HTN, HLD, PE, PAD, CVA with residual dysphagia?. dementia (AAOx1-2 at baseline) who was sent by NH for altered mental status. Patient is usually interactive and talking but this morning patient was found to be lethargic and minimally interactive. Due to this patient was sent to the hospital. Patient was seen at bedside but patient is minimally responsive and only making grunting sounds to painful stimuli. No ROS could be obtained from the patient due to altered mental status.    (14 Feb 2024 21:33)    # CASE D/W  AT BEDSIDE    # SEPSIS S/T UTI  - PLACED ON ROCEPHIN, F/U BCX AND UCX  - ID CONSULT    # ACUTE ENCEPHALOPATHY ON CHRONIC DEMENTIA  - NOTED CT HEAD  - NOTED UTOX, ABG  - NEUROLOGY CONSULT    # NOAH  - IMPROVED S/P IVF    # AMBULATORY DYSFUNCTION S/T OA, OP, HX OF ?CVA  - F/U PT EVAL    # VITAMIN D DEFICIENCY  - REPLETE WITH SUPPLEMENT    # ? HX OF CVA  # ? HX OF PE    # HTN  # HLD  # LEGALLY BLIND, GLAUCOMA  # GI AND DVT PPX     Patient is a 83y old  Female who presents with a chief complaint of AMS. (15 Feb 2024 11:02)    PATIENT IS SEEN AND EXAMINED EARLIER ON MEDICAL FLOOR.      ALLERGIES:  No Known Allergies      VITALS:    Vital Signs Last 24 Hrs  T(C): 36.7 (15 Feb 2024 21:30), Max: 37.1 (15 Feb 2024 07:51)  T(F): 98 (15 Feb 2024 21:30), Max: 98.8 (15 Feb 2024 07:51)  HR: 125 (15 Feb 2024 21:30) (74 - 125)  BP: 196/89 (15 Feb 2024 21:30) (110/78 - 196/89)  BP(mean): --  RR: 16 (15 Feb 2024 21:30) (14 - 18)  SpO2: 97% (15 Feb 2024 21:30) (93% - 100%)    Parameters below as of 15 Feb 2024 21:30  Patient On (Oxygen Delivery Method): nasal cannula  O2 Flow (L/min): 3      LABS:    CBC Full  -  ( 15 Feb 2024 06:10 )  WBC Count : 6.88 K/uL  RBC Count : 3.52 M/uL  Hemoglobin : 10.2 g/dL  Hematocrit : 33.2 %  Platelet Count - Automated : 213 K/uL  Mean Cell Volume : 94.3 fl  Mean Cell Hemoglobin : 29.0 pg  Mean Cell Hemoglobin Concentration : 30.7 gm/dL  Auto Neutrophil # : x  Auto Lymphocyte # : x  Auto Monocyte # : x  Auto Eosinophil # : x  Auto Basophil # : x  Auto Neutrophil % : x  Auto Lymphocyte % : x  Auto Monocyte % : x  Auto Eosinophil % : x  Auto Basophil % : x    PT/INR - ( 14 Feb 2024 16:30 )   PT: 10.7 sec;   INR: 0.94 ratio         PTT - ( 14 Feb 2024 16:30 )  PTT:29.7 sec  02-15    145  |  115<H>  |  17  ----------------------------<  92  4.0   |  23  |  0.89    Ca    8.9      15 Feb 2024 06:10  Phos  3.3     02-15  Mg     2.0     02-15    TPro  7.5  /  Alb  2.8<L>  /  TBili  0.4  /  DBili  x   /  AST  42<H>  /  ALT  25  /  AlkPhos  84  02-14    CAPILLARY BLOOD GLUCOSE            LIVER FUNCTIONS - ( 14 Feb 2024 16:30 )  Alb: 2.8 g/dL / Pro: 7.5 g/dL / ALK PHOS: 84 U/L / ALT: 25 U/L DA / AST: 42 U/L / GGT: x           Creatinine Trend: 0.89<--, 1.58<--  I&O's Summary      ABG - ( 14 Feb 2024 21:43 )  pH, Arterial: 7.41  pH, Blood: x     /  pCO2: 31    /  pO2: 125   / HCO3: 20    / Base Excess: -4.3  /  SaO2: 99                  .Blood Blood-Peripheral  02-14 @ 16:45   No growth at 24 hours  --  --      .Blood Blood-Peripheral  02-14 @ 16:30   No growth at 24 hours  --  --          MEDICATIONS:    MEDICATIONS  (STANDING):  amLODIPine   Tablet 10 milliGRAM(s) Oral once  atorvastatin 20 milliGRAM(s) Oral at bedtime  bacitracin   Ointment 1 Application(s) Topical two times a day  cefTRIAXone   IVPB 1000 milliGRAM(s) IV Intermittent every 24 hours  cholecalciferol 1000 Unit(s) Oral daily  enoxaparin Injectable 30 milliGRAM(s) SubCutaneous every 24 hours  folic acid 1 milliGRAM(s) Oral daily  lactated ringers. 1000 milliLiter(s) (100 mL/Hr) IV Continuous <Continuous>  latanoprost 0.005% Ophthalmic Solution 1 Drop(s) Both EYES at bedtime  multivitamin 1 Tablet(s) Oral daily  polyethylene glycol 3350 17 Gram(s) Oral daily  senna 2 Tablet(s) Oral at bedtime      MEDICATIONS  (PRN):  acetaminophen     Tablet .. 650 milliGRAM(s) Oral every 6 hours PRN Temp greater or equal to 38C (100.4F), Mild Pain (1 - 3)  ondansetron Injectable 4 milliGRAM(s) IV Push every 8 hours PRN Nausea and/or Vomiting      REVIEW OF SYSTEMS:                           ALL ROS DONE [ X   ]    CONSTITUTIONAL:  LETHARGIC [   ], FEVER [   ], UNRESPONSIVE [   ]  CVS:  CP  [   ], SOB, [   ], PALPITATIONS [   ], DIZZYNESS [   ]  RS: COUGH [   ], SPUTUM [   ]  GI: ABDOMINAL PAIN [   ], NAUSEA [   ], VOMITINGS [   ], DIARRHEA [   ], CONSTIPATION [   ]  :  DYSURIA [   ], NOCTURIA [   ], INCREASED FREQUENCY [   ], DRIBLING [   ],  SKELETAL: PAINFUL JOINTS [   ], SWOLLEN JOINTS [   ], NECK ACHE [   ], LOW BACK ACHE [   ],  SKIN : ULCERS [   ], RASH [   ], ITCHING [   ]  CNS: HEAD ACHE [   ], DOUBLE VISION [   ], BLURRED VISION [   ], AMS / CONFUSION [   ], SEIZURES [   ], WEAKNESS [   ],TINGLING / NUMBNESS [   ]    PHYSICAL EXAMINATION:  GENERAL APPEARANCE: NO DISTRESS  HEENT:  NO PALLOR, NO  JVD,  NO   NODES, NECK SUPPLE  CVS: S1 +, S2 +,   RS: AEEB,  OCCASIONAL  RALES +,   NO RONCHI  ABD: SOFT, NT, NO, BS +  EXT: NO PE  SKIN: WARM,   SKELETAL:  ROM ACCEPTABLE [ WHEN EXAMINER MOVES ARMS/LEGS, BUT IS NOT CONSISTENTLY PARTICIPATING IN EXAM - ?DUE TO DEMENTIA]  CNS:  AAO X 1    RADIOLOGY :    RADIOLOGY AND READINGS REVIEWED    ASSESSMENT :     Altered mental status    Hypertension    Hyperlipidemia    Dementia    Pulmonary embolism        PLAN:  HPI:  Patient is a 82 y/o F who is legally blind and totally dependent for ADLS with PMH of HTN, HLD, PE, PAD, CVA with residual dysphagia?. dementia (AAOx1-2 at baseline) who was sent by NH for altered mental status. Patient is usually interactive and talking but this morning patient was found to be lethargic and minimally interactive. Due to this patient was sent to the hospital. Patient was seen at bedside but patient is minimally responsive and only making grunting sounds to painful stimuli. No ROS could be obtained from the patient due to altered mental status.    (14 Feb 2024 21:33)    # CASE D/W  AT BEDSIDE    # SEPSIS S/T UTI  - PLACED ON ROCEPHIN, F/U BCX AND UCX  - ID CONSULT    # ACUTE ENCEPHALOPATHY ON CHRONIC DEMENTIA  - NOTED CT HEAD  - NOTED UTOX, ABG  - NEUROLOGY CONSULT    # NOAH  - IMPROVED S/P IVF    # AMBULATORY DYSFUNCTION S/T OA, OP, HX OF ?CVA  - F/U PT EVAL    # VITAMIN D DEFICIENCY  - REPLETE WITH SUPPLEMENT    # ? HX OF CVA  # ? HX OF PE    # HTN  # HLD  # LEGALLY BLIND, GLAUCOMA  # GI AND DVT PPX

## 2024-02-16 DIAGNOSIS — E87.6 HYPOKALEMIA: ICD-10-CM

## 2024-02-16 LAB
ALBUMIN SERPL ELPH-MCNC: 2.5 G/DL — LOW (ref 3.5–5)
ALP SERPL-CCNC: 72 U/L — SIGNIFICANT CHANGE UP (ref 40–120)
ALT FLD-CCNC: 29 U/L DA — SIGNIFICANT CHANGE UP (ref 10–60)
ANION GAP SERPL CALC-SCNC: 9 MMOL/L — SIGNIFICANT CHANGE UP (ref 5–17)
AST SERPL-CCNC: 50 U/L — HIGH (ref 10–40)
BILIRUB SERPL-MCNC: 0.4 MG/DL — SIGNIFICANT CHANGE UP (ref 0.2–1.2)
BUN SERPL-MCNC: 10 MG/DL — SIGNIFICANT CHANGE UP (ref 7–18)
CALCIUM SERPL-MCNC: 9.3 MG/DL — SIGNIFICANT CHANGE UP (ref 8.4–10.5)
CHLORIDE SERPL-SCNC: 104 MMOL/L — SIGNIFICANT CHANGE UP (ref 96–108)
CO2 SERPL-SCNC: 24 MMOL/L — SIGNIFICANT CHANGE UP (ref 22–31)
CREAT SERPL-MCNC: 0.63 MG/DL — SIGNIFICANT CHANGE UP (ref 0.5–1.3)
EGFR: 88 ML/MIN/1.73M2 — SIGNIFICANT CHANGE UP
GLUCOSE SERPL-MCNC: 86 MG/DL — SIGNIFICANT CHANGE UP (ref 70–99)
HCT VFR BLD CALC: 31.4 % — LOW (ref 34.5–45)
HGB BLD-MCNC: 10.4 G/DL — LOW (ref 11.5–15.5)
MAGNESIUM SERPL-MCNC: 1.7 MG/DL — SIGNIFICANT CHANGE UP (ref 1.6–2.6)
MCHC RBC-ENTMCNC: 30.1 PG — SIGNIFICANT CHANGE UP (ref 27–34)
MCHC RBC-ENTMCNC: 33.1 GM/DL — SIGNIFICANT CHANGE UP (ref 32–36)
MCV RBC AUTO: 90.8 FL — SIGNIFICANT CHANGE UP (ref 80–100)
NRBC # BLD: 0 /100 WBCS — SIGNIFICANT CHANGE UP (ref 0–0)
PHOSPHATE SERPL-MCNC: 2.5 MG/DL — SIGNIFICANT CHANGE UP (ref 2.5–4.5)
PLATELET # BLD AUTO: 239 K/UL — SIGNIFICANT CHANGE UP (ref 150–400)
POTASSIUM SERPL-MCNC: 3.1 MMOL/L — LOW (ref 3.5–5.3)
POTASSIUM SERPL-SCNC: 3.1 MMOL/L — LOW (ref 3.5–5.3)
PROT SERPL-MCNC: 6.3 G/DL — SIGNIFICANT CHANGE UP (ref 6–8.3)
RBC # BLD: 3.46 M/UL — LOW (ref 3.8–5.2)
RBC # FLD: 13.2 % — SIGNIFICANT CHANGE UP (ref 10.3–14.5)
SODIUM SERPL-SCNC: 137 MMOL/L — SIGNIFICANT CHANGE UP (ref 135–145)
WBC # BLD: 5.82 K/UL — SIGNIFICANT CHANGE UP (ref 3.8–10.5)
WBC # FLD AUTO: 5.82 K/UL — SIGNIFICANT CHANGE UP (ref 3.8–10.5)

## 2024-02-16 PROCEDURE — 99223 1ST HOSP IP/OBS HIGH 75: CPT

## 2024-02-16 RX ORDER — ASPIRIN/CALCIUM CARB/MAGNESIUM 324 MG
81 TABLET ORAL DAILY
Refills: 0 | Status: DISCONTINUED | OUTPATIENT
Start: 2024-02-16 | End: 2024-02-23

## 2024-02-16 RX ORDER — CLOPIDOGREL BISULFATE 75 MG/1
75 TABLET, FILM COATED ORAL DAILY
Refills: 0 | Status: DISCONTINUED | OUTPATIENT
Start: 2024-02-16 | End: 2024-02-23

## 2024-02-16 RX ORDER — POTASSIUM CHLORIDE 20 MEQ
40 PACKET (EA) ORAL ONCE
Refills: 0 | Status: COMPLETED | OUTPATIENT
Start: 2024-02-16 | End: 2024-02-16

## 2024-02-16 RX ORDER — BACLOFEN 100 %
10 POWDER (GRAM) MISCELLANEOUS EVERY 12 HOURS
Refills: 0 | Status: DISCONTINUED | OUTPATIENT
Start: 2024-02-16 | End: 2024-02-18

## 2024-02-16 RX ADMIN — SENNA PLUS 2 TABLET(S): 8.6 TABLET ORAL at 21:47

## 2024-02-16 RX ADMIN — Medication 1000 UNIT(S): at 12:09

## 2024-02-16 RX ADMIN — Medication 81 MILLIGRAM(S): at 18:06

## 2024-02-16 RX ADMIN — POLYETHYLENE GLYCOL 3350 17 GRAM(S): 17 POWDER, FOR SOLUTION ORAL at 12:09

## 2024-02-16 RX ADMIN — Medication 1 TABLET(S): at 12:09

## 2024-02-16 RX ADMIN — LATANOPROST 1 DROP(S): 0.05 SOLUTION/ DROPS OPHTHALMIC; TOPICAL at 21:59

## 2024-02-16 RX ADMIN — CEFTRIAXONE 100 MILLIGRAM(S): 500 INJECTION, POWDER, FOR SOLUTION INTRAMUSCULAR; INTRAVENOUS at 05:09

## 2024-02-16 RX ADMIN — Medication 1 APPLICATION(S): at 05:03

## 2024-02-16 RX ADMIN — CLOPIDOGREL BISULFATE 75 MILLIGRAM(S): 75 TABLET, FILM COATED ORAL at 18:06

## 2024-02-16 RX ADMIN — Medication 40 MILLIEQUIVALENT(S): at 10:07

## 2024-02-16 RX ADMIN — Medication 1 MILLIGRAM(S): at 12:09

## 2024-02-16 RX ADMIN — ENOXAPARIN SODIUM 30 MILLIGRAM(S): 100 INJECTION SUBCUTANEOUS at 05:03

## 2024-02-16 RX ADMIN — ATORVASTATIN CALCIUM 20 MILLIGRAM(S): 80 TABLET, FILM COATED ORAL at 21:47

## 2024-02-16 RX ADMIN — Medication 10 MILLIGRAM(S): at 17:59

## 2024-02-16 RX ADMIN — Medication 1 APPLICATION(S): at 17:59

## 2024-02-16 NOTE — PROGRESS NOTE ADULT - SUBJECTIVE AND OBJECTIVE BOX
Patient is a 83y old  Female who presents with a chief complaint of AMS. (15 Feb 2024 23:53)    PATIENT IS SEEN AND EXAMINED IN MEDICAL FLOOR.  SHANAET [    ]    SHIRLEY [   ]      GT [   ]    ALLERGIES:  No Known Allergies      Daily     Daily     VITALS:    Vital Signs Last 24 Hrs  T(C): 36.6 (16 Feb 2024 05:37), Max: 36.9 (15 Feb 2024 16:23)  T(F): 97.8 (16 Feb 2024 05:37), Max: 98.5 (15 Feb 2024 16:23)  HR: 106 (16 Feb 2024 05:37) (94 - 125)  BP: 156/74 (16 Feb 2024 05:37) (110/78 - 196/89)  BP(mean): --  RR: 18 (16 Feb 2024 05:37) (16 - 18)  SpO2: 96% (16 Feb 2024 05:37) (93% - 97%)    Parameters below as of 16 Feb 2024 05:37  Patient On (Oxygen Delivery Method): room air        LABS:    CBC Full  -  ( 16 Feb 2024 08:11 )  WBC Count : 5.82 K/uL  RBC Count : 3.46 M/uL  Hemoglobin : 10.4 g/dL  Hematocrit : 31.4 %  Platelet Count - Automated : 239 K/uL  Mean Cell Volume : 90.8 fl  Mean Cell Hemoglobin : 30.1 pg  Mean Cell Hemoglobin Concentration : 33.1 gm/dL  Auto Neutrophil # : x  Auto Lymphocyte # : x  Auto Monocyte # : x  Auto Eosinophil # : x  Auto Basophil # : x  Auto Neutrophil % : x  Auto Lymphocyte % : x  Auto Monocyte % : x  Auto Eosinophil % : x  Auto Basophil % : x    PT/INR - ( 14 Feb 2024 16:30 )   PT: 10.7 sec;   INR: 0.94 ratio         PTT - ( 14 Feb 2024 16:30 )  PTT:29.7 sec  02-16    137  |  104  |  10  ----------------------------<  86  3.1<L>   |  24  |  0.63    Ca    9.3      16 Feb 2024 08:11  Phos  2.5     02-16  Mg     1.7     02-16    TPro  6.3  /  Alb  2.5<L>  /  TBili  0.4  /  DBili  x   /  AST  50<H>  /  ALT  29  /  AlkPhos  72  02-16    CAPILLARY BLOOD GLUCOSE            LIVER FUNCTIONS - ( 16 Feb 2024 08:11 )  Alb: 2.5 g/dL / Pro: 6.3 g/dL / ALK PHOS: 72 U/L / ALT: 29 U/L DA / AST: 50 U/L / GGT: x           Creatinine Trend: 0.63<--, 0.89<--, 1.58<--  I&O's Summary    15 Feb 2024 07:01  -  16 Feb 2024 07:00  --------------------------------------------------------  IN: 0 mL / OUT: 400 mL / NET: -400 mL        ABG - ( 14 Feb 2024 21:43 )  pH, Arterial: 7.41  pH, Blood: x     /  pCO2: 31    /  pO2: 125   / HCO3: 20    / Base Excess: -4.3  /  SaO2: 99                  Clean Catch Clean Catch (Midstream)  02-14 @ 17:43   >100,000 CFU/ml Escherichia coli  --  --      .Blood Blood-Peripheral  02-14 @ 16:45   No growth at 24 hours  --  --      .Blood Blood-Peripheral  02-14 @ 16:30   No growth at 24 hours  --  --          MEDICATIONS:    MEDICATIONS  (STANDING):  amLODIPine   Tablet 10 milliGRAM(s) Oral daily  atorvastatin 20 milliGRAM(s) Oral at bedtime  bacitracin   Ointment 1 Application(s) Topical two times a day  cefTRIAXone   IVPB 1000 milliGRAM(s) IV Intermittent every 24 hours  cholecalciferol 1000 Unit(s) Oral daily  enoxaparin Injectable 30 milliGRAM(s) SubCutaneous every 24 hours  folic acid 1 milliGRAM(s) Oral daily  lactated ringers. 1000 milliLiter(s) (100 mL/Hr) IV Continuous <Continuous>  latanoprost 0.005% Ophthalmic Solution 1 Drop(s) Both EYES at bedtime  multivitamin 1 Tablet(s) Oral daily  polyethylene glycol 3350 17 Gram(s) Oral daily  senna 2 Tablet(s) Oral at bedtime      MEDICATIONS  (PRN):  acetaminophen     Tablet .. 650 milliGRAM(s) Oral every 6 hours PRN Temp greater or equal to 38C (100.4F), Mild Pain (1 - 3)  ondansetron Injectable 4 milliGRAM(s) IV Push every 8 hours PRN Nausea and/or Vomiting      REVIEW OF SYSTEMS:                           ALL ROS DONE [ X   ]    CONSTITUTIONAL:  LETHARGIC [   ], FEVER [   ], UNRESPONSIVE [   ]  CVS:  CP  [   ], SOB, [   ], PALPITATIONS [   ], DIZZYNESS [   ]  RS: COUGH [   ], SPUTUM [   ]  GI: ABDOMINAL PAIN [   ], NAUSEA [   ], VOMITINGS [   ], DIARRHEA [   ], CONSTIPATION [   ]  :  DYSURIA [   ], NOCTURIA [   ], INCREASED FREQUENCY [   ], DRIBLING [   ],  SKELETAL: PAINFUL JOINTS [   ], SWOLLEN JOINTS [   ], NECK ACHE [   ], LOW BACK ACHE [   ],  SKIN : ULCERS [   ], RASH [   ], ITCHING [   ]  CNS: HEAD ACHE [   ], DOUBLE VISION [   ], BLURRED VISION [   ], AMS / CONFUSION [   ], SEIZURES [   ], WEAKNESS [   ],TINGLING / NUMBNESS [   ]    PHYSICAL EXAMINATION:  GENERAL APPEARANCE: NO DISTRESS  HEENT:  NO PALLOR, NO  JVD,  NO   NODES, NECK SUPPLE  CVS: S1 +, S2 +,   RS: AEEB,  OCCASIONAL  RALES +,   NO RONCHI  ABD: SOFT, NT, NO, BS +  EXT: NO PE  SKIN: WARM,   SKELETAL:  ROM ACCEPTABLE [ WHEN EXAMINER MOVES ARMS/LEGS, BUT IS NOT CONSISTENTLY PARTICIPATING IN EXAM - ?DUE TO DEMENTIA]  CNS:  AAO X 1    RADIOLOGY :    RADIOLOGY AND READINGS REVIEWED    ASSESSMENT :     Altered mental status    Hypertension    Hyperlipidemia    Dementia    Pulmonary embolism        PLAN:  HPI:  Patient is a 84 y/o F who is legally blind and totally dependent for ADLS with PMH of HTN, HLD, PE, PAD, CVA with residual dysphagia?. dementia (AAOx1-2 at baseline) who was sent by NH for altered mental status. Patient is usually interactive and talking but this morning patient was found to be lethargic and minimally interactive. Due to this patient was sent to the hospital. Patient was seen at bedside but patient is minimally responsive and only making grunting sounds to painful stimuli. No ROS could be obtained from the patient due to altered mental status.    (14 Feb 2024 21:33)    # CASE D/W  AT BEDSIDE    # SEPSIS S/T UTI  - PLACED ON ROCEPHIN, F/U BCX AND UCX  - ID CONSULT    # ACUTE ENCEPHALOPATHY ON CHRONIC DEMENTIA  - NOTED CT HEAD  - NOTED UTOX, ABG  - NEUROLOGY CONSULT    # NOAH  - IMPROVED S/P IVF    # AMBULATORY DYSFUNCTION S/T OA, OP, HX OF ?CVA  - F/U PT EVAL    # VITAMIN D DEFICIENCY  - REPLETE WITH SUPPLEMENT    # ? HX OF CVA  # ? HX OF PE    # HTN  # HLD  # LEGALLY BLIND, GLAUCOMA  # GI AND DVT PPX   Patient is a 83y old  Female who presents with a chief complaint of AMS. (15 Feb 2024 23:53)    PATIENT IS SEEN AND EXAMINED IN MEDICAL FLOOR.      ALLERGIES:  No Known Allergies      VITALS:    Vital Signs Last 24 Hrs  T(C): 36.6 (16 Feb 2024 05:37), Max: 36.9 (15 Feb 2024 16:23)  T(F): 97.8 (16 Feb 2024 05:37), Max: 98.5 (15 Feb 2024 16:23)  HR: 106 (16 Feb 2024 05:37) (94 - 125)  BP: 156/74 (16 Feb 2024 05:37) (110/78 - 196/89)  BP(mean): --  RR: 18 (16 Feb 2024 05:37) (16 - 18)  SpO2: 96% (16 Feb 2024 05:37) (93% - 97%)    Parameters below as of 16 Feb 2024 05:37  Patient On (Oxygen Delivery Method): room air        LABS:    CBC Full  -  ( 16 Feb 2024 08:11 )  WBC Count : 5.82 K/uL  RBC Count : 3.46 M/uL  Hemoglobin : 10.4 g/dL  Hematocrit : 31.4 %  Platelet Count - Automated : 239 K/uL  Mean Cell Volume : 90.8 fl  Mean Cell Hemoglobin : 30.1 pg  Mean Cell Hemoglobin Concentration : 33.1 gm/dL  Auto Neutrophil # : x  Auto Lymphocyte # : x  Auto Monocyte # : x  Auto Eosinophil # : x  Auto Basophil # : x  Auto Neutrophil % : x  Auto Lymphocyte % : x  Auto Monocyte % : x  Auto Eosinophil % : x  Auto Basophil % : x    PT/INR - ( 14 Feb 2024 16:30 )   PT: 10.7 sec;   INR: 0.94 ratio         PTT - ( 14 Feb 2024 16:30 )  PTT:29.7 sec  02-16    137  |  104  |  10  ----------------------------<  86  3.1<L>   |  24  |  0.63    Ca    9.3      16 Feb 2024 08:11  Phos  2.5     02-16  Mg     1.7     02-16    TPro  6.3  /  Alb  2.5<L>  /  TBili  0.4  /  DBili  x   /  AST  50<H>  /  ALT  29  /  AlkPhos  72  02-16    CAPILLARY BLOOD GLUCOSE            LIVER FUNCTIONS - ( 16 Feb 2024 08:11 )  Alb: 2.5 g/dL / Pro: 6.3 g/dL / ALK PHOS: 72 U/L / ALT: 29 U/L DA / AST: 50 U/L / GGT: x           Creatinine Trend: 0.63<--, 0.89<--, 1.58<--  I&O's Summary    15 Feb 2024 07:01  -  16 Feb 2024 07:00  --------------------------------------------------------  IN: 0 mL / OUT: 400 mL / NET: -400 mL        ABG - ( 14 Feb 2024 21:43 )  pH, Arterial: 7.41  pH, Blood: x     /  pCO2: 31    /  pO2: 125   / HCO3: 20    / Base Excess: -4.3  /  SaO2: 99                  Clean Catch Clean Catch (Midstream)  02-14 @ 17:43   >100,000 CFU/ml Escherichia coli  --  --      .Blood Blood-Peripheral  02-14 @ 16:45   No growth at 24 hours  --  --      .Blood Blood-Peripheral  02-14 @ 16:30   No growth at 24 hours  --  --          MEDICATIONS:    MEDICATIONS  (STANDING):  amLODIPine   Tablet 10 milliGRAM(s) Oral daily  atorvastatin 20 milliGRAM(s) Oral at bedtime  bacitracin   Ointment 1 Application(s) Topical two times a day  cefTRIAXone   IVPB 1000 milliGRAM(s) IV Intermittent every 24 hours  cholecalciferol 1000 Unit(s) Oral daily  enoxaparin Injectable 30 milliGRAM(s) SubCutaneous every 24 hours  folic acid 1 milliGRAM(s) Oral daily  lactated ringers. 1000 milliLiter(s) (100 mL/Hr) IV Continuous <Continuous>  latanoprost 0.005% Ophthalmic Solution 1 Drop(s) Both EYES at bedtime  multivitamin 1 Tablet(s) Oral daily  polyethylene glycol 3350 17 Gram(s) Oral daily  senna 2 Tablet(s) Oral at bedtime      MEDICATIONS  (PRN):  acetaminophen     Tablet .. 650 milliGRAM(s) Oral every 6 hours PRN Temp greater or equal to 38C (100.4F), Mild Pain (1 - 3)  ondansetron Injectable 4 milliGRAM(s) IV Push every 8 hours PRN Nausea and/or Vomiting      REVIEW OF SYSTEMS:                           ALL ROS DONE [ X   ]    CONSTITUTIONAL:  LETHARGIC [   ], FEVER [   ], UNRESPONSIVE [   ]  CVS:  CP  [   ], SOB, [   ], PALPITATIONS [   ], DIZZYNESS [   ]  RS: COUGH [   ], SPUTUM [   ]  GI: ABDOMINAL PAIN [   ], NAUSEA [   ], VOMITINGS [   ], DIARRHEA [   ], CONSTIPATION [   ]  :  DYSURIA [   ], NOCTURIA [   ], INCREASED FREQUENCY [   ], DRIBLING [   ],  SKELETAL: PAINFUL JOINTS [   ], SWOLLEN JOINTS [   ], NECK ACHE [   ], LOW BACK ACHE [   ],  SKIN : ULCERS [   ], RASH [   ], ITCHING [   ]  CNS: HEAD ACHE [   ], DOUBLE VISION [   ], BLURRED VISION [   ], AMS / CONFUSION [   ], SEIZURES [   ], WEAKNESS [   ],TINGLING / NUMBNESS [   ]    PHYSICAL EXAMINATION:  GENERAL APPEARANCE: NO DISTRESS  HEENT:  NO PALLOR, NO  JVD,  NO   NODES, NECK SUPPLE  CVS: S1 +, S2 +,   RS: AEEB,  OCCASIONAL  RALES +,   NO RONCHI  ABD: SOFT, NT, NO, BS +  EXT: NO PE  SKIN: WARM,   SKELETAL:  ROM ACCEPTABLE [ WHEN EXAMINER MOVES ARMS/LEGS, BUT IS NOT CONSISTENTLY PARTICIPATING IN EXAM - ?DUE TO DEMENTIA]  CNS:  AAO X 1    RADIOLOGY :    RADIOLOGY AND READINGS REVIEWED    ASSESSMENT :     Altered mental status    Hypertension    Hyperlipidemia    Dementia    Pulmonary embolism        PLAN:  HPI:  Patient is a 82 y/o F who is legally blind and totally dependent for ADLS with PMH of HTN, HLD, PE, PAD, CVA with residual dysphagia?. dementia (AAOx1-2 at baseline) who was sent by NH for altered mental status. Patient is usually interactive and talking but this morning patient was found to be lethargic and minimally interactive. Due to this patient was sent to the hospital. Patient was seen at bedside but patient is minimally responsive and only making grunting sounds to painful stimuli. No ROS could be obtained from the patient due to altered mental status.    (14 Feb 2024 21:33)    # CASE D/W  AT BEDSIDE    # SEPSIS S/T UTI  - PLACED ON ROCEPHIN, F/U BCX AND UCX  - ID CONSULT    # ACUTE ENCEPHALOPATHY ON CHRONIC DEMENTIA  - NOTED CT HEAD  - RECOMMENDED FOR REPEAT CTA HEAD AND NECK VS. MRA HEAD/NECK + MR BRAIN PER NEUROLOGY  - F/U PT EVAL  - F/U ST EVAL  - ASA + PLAVIX  - NOTED UTOX, ABG  - NEUROLOGY CONSULT    # NOAH  - IMPROVED S/P IVF    # AMBULATORY DYSFUNCTION S/T OA, OP, HX OF ?CVA  - F/U PT EVAL    # VITAMIN D DEFICIENCY  - REPLETE WITH SUPPLEMENT    # ? HX OF CVA  # ? HX OF PE IN [facility] IN 2022 - ? UNTREATED DUE TO RECURRENT FALLS     # HTN  # HLD  # LEGALLY BLIND, GLAUCOMA  # GI AND DVT PPX

## 2024-02-16 NOTE — PROGRESS NOTE ADULT - PROBLEM SELECTOR PLAN 1
- P/w lethargy and found to be minimally responsive.   - CT head: chronic ischemic changes with atrophy, most prevalent in the frontal temporal region.. No CT evidence of an acute infarct, hemorrhage, or mass.  - Normal Na, K, blood glucose and PCO2.   - Mildly elevated corrected calcium - 10.8.   - UA +ve.   - Likely AMS 2/2 to UTI vs hypercalcemia   - C/w IV fluids, treat underlying infection.   - Utox negative  - Consulted neurology - Dr. Thornton  - started Baclofen 10mg BID per reccs  - dr. Thornton recc -likely new left frontal infarct, not a candidate tPA  -c/w ASA, Plavix, Statin  - PT/SLP evaluation   - repeat CT CTA with perfusion (pt unable to lie still for MRI MRA) - P/w lethargy and found to be minimally responsive.   - CT head: chronic ischemic changes with atrophy, most prevalent in the frontal temporal region.. No CT evidence of an acute infarct, hemorrhage, or mass.  - Normal Na, K, blood glucose and PCO2.   - Mildly elevated corrected calcium - 10.8.   - UA +ve.   - Likely AMS 2/2 to UTI vs hypercalcemia   - C/w IV fluids, treat underlying infection.   - Utox negative  - Consulted neurology - Dr. Thornton  - started Baclofen 10mg BID per reccs for muscle spasticity   - dr. Thornton recc -likely new left frontal infarct, not a candidate tPA  -c/w ASA, Plavix, Statin  - PT/SLP evaluation   - repeat CT/CTA with perfusion (pt unable to lie still for MRI MRA) per neuro reccs, give baclofen tonight and test in AM as d/w dr. Thornton

## 2024-02-16 NOTE — CONSULT NOTE ADULT - SUBJECTIVE AND OBJECTIVE BOX
HPI:  Patient is a 82 y/o F who is legally blind and totally dependent for ADLS with PMH of HTN, HLD, PE, PAD, CVA with residual dysphagia?. dementia (AAOx1-2 at baseline) who was sent by NH for altered mental status. Patient is usually interactive and talking but this morning patient was found to be lethargic and minimally interactive. Due to this patient was sent to the hospital. Patient was seen at bedside but patient is minimally responsive and only making grunting sounds to painful stimuli. No ROS could be obtained from the patient due to altered mental status.    (14 Feb 2024 21:33)      PAST MEDICAL & SURGICAL HISTORY:  Hypertension      Hyperlipidemia      Dementia      Pulmonary embolism          No Known Allergies      Meds:  acetaminophen     Tablet .. 650 milliGRAM(s) Oral every 6 hours PRN  amLODIPine   Tablet 10 milliGRAM(s) Oral daily  aspirin  chewable 81 milliGRAM(s) Oral daily  atorvastatin 20 milliGRAM(s) Oral at bedtime  bacitracin   Ointment 1 Application(s) Topical two times a day  baclofen 10 milliGRAM(s) Oral every 12 hours  cefTRIAXone   IVPB 1000 milliGRAM(s) IV Intermittent every 24 hours  cholecalciferol 1000 Unit(s) Oral daily  clopidogrel Tablet 75 milliGRAM(s) Oral daily  enoxaparin Injectable 30 milliGRAM(s) SubCutaneous every 24 hours  folic acid 1 milliGRAM(s) Oral daily  lactated ringers. 1000 milliLiter(s) IV Continuous <Continuous>  latanoprost 0.005% Ophthalmic Solution 1 Drop(s) Both EYES at bedtime  multivitamin 1 Tablet(s) Oral daily  ondansetron Injectable 4 milliGRAM(s) IV Push every 8 hours PRN  polyethylene glycol 3350 17 Gram(s) Oral daily  senna 2 Tablet(s) Oral at bedtime      SOCIAL HISTORY:  Smoker:  YES / NO        PACK YEARS:                         WHEN QUIT?  ETOH use:  YES / NO               FREQUENCY / QUANTITY:  Ilicit Drug use:  YES / NO  Occupation:  Assisted device use (Cane / Walker):  Live with:    FAMILY HISTORY:      VITALS:  Vital Signs Last 24 Hrs  T(C): 36.3 (16 Feb 2024 13:00), Max: 36.7 (15 Feb 2024 21:30)  T(F): 97.4 (16 Feb 2024 13:00), Max: 98 (15 Feb 2024 21:30)  HR: 66 (16 Feb 2024 13:00) (66 - 125)  BP: 145/64 (16 Feb 2024 13:00) (145/64 - 196/89)  BP(mean): --  RR: 18 (16 Feb 2024 13:00) (16 - 18)  SpO2: 98% (16 Feb 2024 13:00) (95% - 98%)    Parameters below as of 16 Feb 2024 13:00  Patient On (Oxygen Delivery Method): room air        LABS/DIAGNOSTIC TESTS:                          10.4   5.82  )-----------( 239      ( 16 Feb 2024 08:11 )             31.4     WBC Count: 5.82 K/uL (02-16 @ 08:11)  WBC Count: 6.88 K/uL (02-15 @ 06:10)  WBC Count: 7.98 K/uL (02-14 @ 16:30)      02-16    137  |  104  |  10  ----------------------------<  86  3.1<L>   |  24  |  0.63    Ca    9.3      16 Feb 2024 08:11  Phos  2.5     02-16  Mg     1.7     02-16    TPro  6.3  /  Alb  2.5<L>  /  TBili  0.4  /  DBili  x   /  AST  50<H>  /  ALT  29  /  AlkPhos  72  02-16      Urinalysis Basic - ( 16 Feb 2024 08:11 )    Color: x / Appearance: x / SG: x / pH: x  Gluc: 86 mg/dL / Ketone: x  / Bili: x / Urobili: x   Blood: x / Protein: x / Nitrite: x   Leuk Esterase: x / RBC: x / WBC x   Sq Epi: x / Non Sq Epi: x / Bacteria: x        LIVER FUNCTIONS - ( 16 Feb 2024 08:11 )  Alb: 2.5 g/dL / Pro: 6.3 g/dL / ALK PHOS: 72 U/L / ALT: 29 U/L DA / AST: 50 U/L / GGT: x                 LACTATE:    ABG - ABG - ( 14 Feb 2024 21:43 )  pH, Arterial: 7.41  pH, Blood: x     /  pCO2: 31    /  pO2: 125   / HCO3: 20    / Base Excess: -4.3  /  SaO2: 99                  CULTURES:   Clean Catch Clean Catch (Midstream)  02-14 @ 17:43   >100,000 CFU/ml Escherichia coli  --  --      .Blood Blood-Peripheral  02-14 @ 16:45   No growth at 24 hours  --  --      .Blood Blood-Peripheral  02-14 @ 16:30   No growth at 24 hours  --  --          RADIOLOGY:< from: Xray Chest 1 View- PORTABLE-Urgent (02.14.24 @ 16:52) >  ACC: [id number] EXAM:  XR CHEST PORTABLE URGENT 1V   ORDERED BY: MAX LAZARUS     PROCEDURE DATE:  02/14/2024          INTERPRETATION:  Portable AP chest radiographs    COMPARISON:  NONE.    CLINICAL INFORMATION: Sepsis.    FINDINGS:  CATHETERS AND TUBES: None    PULMONARY: The airway is midline.  There are no airspace consolidations or radiographic evidence of   pulmonary nodules..  No pleural effusion or pneumothorax.    HEART/VASCULAR: The heart size and mediastinum configuration are within   the limits of normal.    BONES: The visualized osseous thorax is intact.    IMPRESSION:    No radiographic evidence of active chest disease..    --- End of Report ---            SHAAN TEMPLE MD; Attending Radiologist  This document has been electronically signed. Feb 14 2024  5:12PM    < end of copied text >  --------------------------------------------------------------------------------------------------------------------------  ACC: [id number] EXAM:  CT BRAIN   ORDERED BY: MAX LAZARUS     PROCEDURE DATE:  02/14/2024          INTERPRETATION:  INDICATION:  Altered mental status.  TECHNIQUE:  A non contrast thin section axial CT study of the brain was   performed from skull base to vertex. Coronal and sagittal reformations   were generated from the axial data.  COMPARISON EXAMINATION:  No prior    FINDINGS:    HEMISPHERES:  Chronic ischemic changes are noted in the white matter of   both hemispheres with atrophy. This is more prominent on the left. There   is no CT evidence of an acute infarct, hemorrhage, or mass. There is   moderate to severe temporal lobe atrophy with moderate frontal volume   loss.  VENTRICLES:  Midline with ex vacuo enlargement  POSTERIOR FOSSA:  The brain stem and cerebellum are unremarkable.  No CP   angle lesion noted.  EXTRACEREBRAL SPACES:  No subdural or epidural collections are noted.  SKULL BASE AND CALVARIUM:  Appears intact.  No fracture or destructive   lesion is identified.  SINUSES AND MASTOIDS:  Clear.  MISCELLANEOUS:  No orbital or suprasellar abnormality noted.    IMPRESSION:    1)  chronic ischemic changes with atrophy, most prevalent in the frontal   temporal region.. No CT evidence of an acute infarct, hemorrhage, or mass.  2)  clear sinuses and mastoids..    Follow-up MR imaging of the brain may be considered for further   assessment.    --- End of Report ---            FERNANDO STEPHENS MD; Attending Radiologist  This document has been electronically signed. Feb 14 20245:33PM    < end of copied text >        ROS  [  ] UNABLE TO ELICIT               HPI:  Patient is a 84 y/o F who is legally blind and totally dependent for ADLS with PMH of HTN, HLD, PE, PAD, CVA with residual dysphagia?. dementia (AAOx1-2 at baseline) who was sent by NH for altered mental status. Patient is usually interactive and talking but this morning patient was found to be lethargic and minimally interactive. Due to this patient was sent to the hospital. Patient was seen at bedside but patient is minimally responsive and only making grunting sounds to painful stimuli. No ROS could be obtained from the patient due to altered mental status.    (14 Feb 2024 21:33)        History as above, asked to see this patient who is from a NH, and was sent for AMS and lethargy. She is currently very lethargic and not waking up to answer any questions , she was found to have a UTI here. She is suspected to have an acute CVA but is not a candidate for TPA. She is currently on Rocephin for her UTI  her urine cultures are growing out E. Coli. She has no fevers or Leukocytosis.        PAST MEDICAL & SURGICAL HISTORY:  Hypertension      Hyperlipidemia      Dementia      Pulmonary embolism          No Known Allergies      Meds:  acetaminophen     Tablet .. 650 milliGRAM(s) Oral every 6 hours PRN  amLODIPine   Tablet 10 milliGRAM(s) Oral daily  aspirin  chewable 81 milliGRAM(s) Oral daily  atorvastatin 20 milliGRAM(s) Oral at bedtime  bacitracin   Ointment 1 Application(s) Topical two times a day  baclofen 10 milliGRAM(s) Oral every 12 hours  cefTRIAXone   IVPB 1000 milliGRAM(s) IV Intermittent every 24 hours  cholecalciferol 1000 Unit(s) Oral daily  clopidogrel Tablet 75 milliGRAM(s) Oral daily  enoxaparin Injectable 30 milliGRAM(s) SubCutaneous every 24 hours  folic acid 1 milliGRAM(s) Oral daily  lactated ringers. 1000 milliLiter(s) IV Continuous <Continuous>  latanoprost 0.005% Ophthalmic Solution 1 Drop(s) Both EYES at bedtime  multivitamin 1 Tablet(s) Oral daily  ondansetron Injectable 4 milliGRAM(s) IV Push every 8 hours PRN  polyethylene glycol 3350 17 Gram(s) Oral daily  senna 2 Tablet(s) Oral at bedtime      SOCIAL HISTORY: unknown    FAMILY HISTORY:  unknown      VITALS:  Vital Signs Last 24 Hrs  T(C): 36.3 (16 Feb 2024 13:00), Max: 36.7 (15 Feb 2024 21:30)  T(F): 97.4 (16 Feb 2024 13:00), Max: 98 (15 Feb 2024 21:30)  HR: 66 (16 Feb 2024 13:00) (66 - 125)  BP: 145/64 (16 Feb 2024 13:00) (145/64 - 196/89)  BP(mean): --  RR: 18 (16 Feb 2024 13:00) (16 - 18)  SpO2: 98% (16 Feb 2024 13:00) (95% - 98%)    Parameters below as of 16 Feb 2024 13:00  Patient On (Oxygen Delivery Method): room air        LABS/DIAGNOSTIC TESTS:                          10.4   5.82  )-----------( 239      ( 16 Feb 2024 08:11 )             31.4     WBC Count: 5.82 K/uL (02-16 @ 08:11)  WBC Count: 6.88 K/uL (02-15 @ 06:10)  WBC Count: 7.98 K/uL (02-14 @ 16:30)      02-16    137  |  104  |  10  ----------------------------<  86  3.1<L>   |  24  |  0.63    Ca    9.3      16 Feb 2024 08:11  Phos  2.5     02-16  Mg     1.7     02-16    TPro  6.3  /  Alb  2.5<L>  /  TBili  0.4  /  DBili  x   /  AST  50<H>  /  ALT  29  /  AlkPhos  72  02-16      Urine Microscopic-Add On (NC) (02.14.24 @ 17:43)   Bacteria: Too Numerous to count /HPF  Squamous Epithelial Cells: Present  White Blood Cell - Urine: 55 /HPF  Red Blood Cell - Urine: 10 /HPFUrinalysis (02.14.24 @ 17:43)   pH Urine: 7.5  Blood, Urine: Negative  Glucose Qualitative, Urine: Negative mg/dL  Color: Yellow  Urine Appearance: Turbid  Bilirubin: Negative  Ketone - Urine: Negative mg/dL  Specific Gravity: 1.013  Protein, Urine: 30 mg/dL  Urobilinogen: 0.2 mg/dL  Nitrite: Negative  Leukocyte Esterase Concentration: Large      LIVER FUNCTIONS - ( 16 Feb 2024 08:11 )  Alb: 2.5 g/dL / Pro: 6.3 g/dL / ALK PHOS: 72 U/L / ALT: 29 U/L DA / AST: 50 U/L / GGT: x                 LACTATE:    ABG - ABG - ( 14 Feb 2024 21:43 )  pH, Arterial: 7.41  pH, Blood: x     /  pCO2: 31    /  pO2: 125   / HCO3: 20    / Base Excess: -4.3  /  SaO2: 99                  CULTURES:   Clean Catch Clean Catch (Midstream)  02-14 @ 17:43   >100,000 CFU/ml Escherichia coli  --  --      .Blood Blood-Peripheral  02-14 @ 16:45   No growth at 24 hours  --  --      .Blood Blood-Peripheral  02-14 @ 16:30   No growth at 24 hours  --  --          RADIOLOGY:< from: Xray Chest 1 View- PORTABLE-Urgent (02.14.24 @ 16:52) >  ACC: [id number] EXAM:  XR CHEST PORTABLE URGENT 1V   ORDERED BY: MAX LAZARUS     PROCEDURE DATE:  02/14/2024          INTERPRETATION:  Portable AP chest radiographs    COMPARISON:  NONE.    CLINICAL INFORMATION: Sepsis.    FINDINGS:  CATHETERS AND TUBES: None    PULMONARY: The airway is midline.  There are no airspace consolidations or radiographic evidence of   pulmonary nodules..  No pleural effusion or pneumothorax.    HEART/VASCULAR: The heart size and mediastinum configuration are within   the limits of normal.    BONES: The visualized osseous thorax is intact.    IMPRESSION:    No radiographic evidence of active chest disease..    --- End of Report ---            SHAAN TEMPLE MD; Attending Radiologist  This document has been electronically signed. Feb 14 2024  5:12PM    < end of copied text >  --------------------------------------------------------------------------------------------------------------------------  ACC: [id number] EXAM:  CT BRAIN   ORDERED BY: MAX LAZARUS     PROCEDURE DATE:  02/14/2024          INTERPRETATION:  INDICATION:  Altered mental status.  TECHNIQUE:  A non contrast thin section axial CT study of the brain was   performed from skull base to vertex. Coronal and sagittal reformations   were generated from the axial data.  COMPARISON EXAMINATION:  No prior    FINDINGS:    HEMISPHERES:  Chronic ischemic changes are noted in the white matter of   both hemispheres with atrophy. This is more prominent on the left. There   is no CT evidence of an acute infarct, hemorrhage, or mass. There is   moderate to severe temporal lobe atrophy with moderate frontal volume   loss.  VENTRICLES:  Midline with ex vacuo enlargement  POSTERIOR FOSSA:  The brain stem and cerebellum are unremarkable.  No CP   angle lesion noted.  EXTRACEREBRAL SPACES:  No subdural or epidural collections are noted.  SKULL BASE AND CALVARIUM:  Appears intact.  No fracture or destructive   lesion is identified.  SINUSES AND MASTOIDS:  Clear.  MISCELLANEOUS:  No orbital or suprasellar abnormality noted.    IMPRESSION:    1)  chronic ischemic changes with atrophy, most prevalent in the frontal   temporal region.. No CT evidence of an acute infarct, hemorrhage, or mass.  2)  clear sinuses and mastoids..    Follow-up MR imaging of the brain may be considered for further   assessment.    --- End of Report ---            FERNANDO STEPHENS MD; Attending Radiologist  This document has been electronically signed. Feb 14 20245:33PM    < end of copied text >        ROS  [ x ] UNABLE TO ELICIT

## 2024-02-16 NOTE — SWALLOW BEDSIDE ASSESSMENT ADULT - SWALLOW EVAL: RECOMMENDED FEEDING/EATING TECHNIQUES
Ensure oral clearance at end of meal/alternate food with liquid/maintain upright posture during/after eating for 30 mins/position upright (90 degrees)/small sips/bites

## 2024-02-16 NOTE — SWALLOW BEDSIDE ASSESSMENT ADULT - SWALLOW EVAL: DIAGNOSIS
Patient demonstrates mild s/s of possible dysphagia c/b extended mastication and manipulation, slow oral handling, delayed swallow initiation and multiple swallows to clear. No overt or subtle indicators of aspiration or penetration appreciated across PO trials this date.

## 2024-02-16 NOTE — CONSULT NOTE ADULT - ASSESSMENT
A/P: Likely new left frontal ischemic infarct    - No IV tpa given because time of onset is unknown  - ASA/ PLavix  - Statin  - Dysphagia screen  - DVT prophylaxis  Repeat CT CTA with perfusion or MRI MRA wo contrast of head  - PT eval  - Speech/swallow eval  
UTI    Plan - Cont Rocephin 1 gm iv q24hrs   await sensitivities.

## 2024-02-16 NOTE — PROGRESS NOTE ADULT - SUBJECTIVE AND OBJECTIVE BOX
NP Note discussed with  Primary Attending    INTERVAL HPI/OVERNIGHT EVENTS: non verbal on exam, no new overnight event.     MEDICATIONS  (STANDING):  amLODIPine   Tablet 10 milliGRAM(s) Oral daily  atorvastatin 20 milliGRAM(s) Oral at bedtime  bacitracin   Ointment 1 Application(s) Topical two times a day  baclofen 10 milliGRAM(s) Oral every 12 hours  cefTRIAXone   IVPB 1000 milliGRAM(s) IV Intermittent every 24 hours  cholecalciferol 1000 Unit(s) Oral daily  enoxaparin Injectable 30 milliGRAM(s) SubCutaneous every 24 hours  folic acid 1 milliGRAM(s) Oral daily  lactated ringers. 1000 milliLiter(s) (100 mL/Hr) IV Continuous <Continuous>  latanoprost 0.005% Ophthalmic Solution 1 Drop(s) Both EYES at bedtime  multivitamin 1 Tablet(s) Oral daily  polyethylene glycol 3350 17 Gram(s) Oral daily  senna 2 Tablet(s) Oral at bedtime    MEDICATIONS  (PRN):  acetaminophen     Tablet .. 650 milliGRAM(s) Oral every 6 hours PRN Temp greater or equal to 38C (100.4F), Mild Pain (1 - 3)  ondansetron Injectable 4 milliGRAM(s) IV Push every 8 hours PRN Nausea and/or Vomiting      __________________________________________________  REVIEW OF SYSTEMS:  unable ROS due to dementia. non verbal, mumbles     Vital Signs Last 24 Hrs  T(C): 36.3 (16 Feb 2024 13:00), Max: 36.9 (15 Feb 2024 16:23)  T(F): 97.4 (16 Feb 2024 13:00), Max: 98.5 (15 Feb 2024 16:23)  HR: 66 (16 Feb 2024 13:00) (66 - 125)  BP: 145/64 (16 Feb 2024 13:00) (145/64 - 196/89)  BP(mean): --  RR: 18 (16 Feb 2024 13:00) (16 - 18)  SpO2: 98% (16 Feb 2024 13:00) (93% - 98%)    Parameters below as of 16 Feb 2024 13:00  Patient On (Oxygen Delivery Method): room air        ________________________________________________  PHYSICAL EXAM:  GENERAL: NAD  HEENT: Normocephalic;  conjunctivae and sclerae clear; moist mucous membranes;   NECK : supple  CHEST/LUNG: Clear to auscultation bilaterally with good air entry   HEART: S1 S2  regular; no murmurs, gallops or rubs  ABDOMEN: Soft, Nontender, Nondistended; Bowel sounds present  EXTREMITIES: no cyanosis; no edema; no calf tenderness  SKIN: warm and dry;   -There is a Stage 1 Pressure Injury to the Bilateral Heels, as evident by non-blanchable erythema  -There is a Stage 2 Pressure Injury to the Coccyx (1cm x 0.5cm x 0.1cm) with pink tissue and scant drainage  -There is a Skin Tear (x2) to the R. Upper Extremity with red tissue, drainage, and surrounding tissue ecchymosis  -There is evidence of Bilateral Upper Extremity ecchymosis  NERVOUS SYSTEM:  Awake and alert; Oriented  to self, mumbles, contracted all extremities.     _________________________________________________  LABS:                        10.4   5.82  )-----------( 239      ( 16 Feb 2024 08:11 )             31.4     02-16    137  |  104  |  10  ----------------------------<  86  3.1<L>   |  24  |  0.63    Ca    9.3      16 Feb 2024 08:11  Phos  2.5     02-16  Mg     1.7     02-16    TPro  6.3  /  Alb  2.5<L>  /  TBili  0.4  /  DBili  x   /  AST  50<H>  /  ALT  29  /  AlkPhos  72  02-16    PT/INR - ( 14 Feb 2024 16:30 )   PT: 10.7 sec;   INR: 0.94 ratio         PTT - ( 14 Feb 2024 16:30 )  PTT:29.7 sec  Urinalysis Basic - ( 16 Feb 2024 08:11 )    Color: x / Appearance: x / SG: x / pH: x  Gluc: 86 mg/dL / Ketone: x  / Bili: x / Urobili: x   Blood: x / Protein: x / Nitrite: x   Leuk Esterase: x / RBC: x / WBC x   Sq Epi: x / Non Sq Epi: x / Bacteria: x      CAPILLARY BLOOD GLUCOSE    RADIOLOGY & ADDITIONAL TESTS:    Imaging  Reviewed:  YES  < from: CT Head No Cont (02.14.24 @ 17:29) >    ACC: [id number] EXAM:  CT BRAIN   ORDERED BY: e-TagARUS     PROCEDURE DATE:  02/14/2024          INTERPRETATION:  INDICATION:  Altered mental status.  TECHNIQUE:  A non contrast thin section axial CT study of the brain was   performed from skull base to vertex. Coronal and sagittal reformations   were generated from the axial data.  COMPARISON EXAMINATION:  No prior    FINDINGS:    HEMISPHERES:  Chronic ischemic changes are noted in the white matter of   both hemispheres with atrophy. This is more prominent on the left. There   is no CT evidence of an acute infarct, hemorrhage, or mass. There is   moderate to severe temporal lobe atrophy with moderate frontal volume   loss.  VENTRICLES:  Midline with ex vacuo enlargement  POSTERIOR FOSSA:  The brain stem and cerebellum are unremarkable.  No CP   angle lesion noted.  EXTRACEREBRAL SPACES:  No subdural or epidural collections are noted.  SKULL BASE AND CALVARIUM:  Appears intact.  No fracture or destructive   lesion is identified.  SINUSES AND MASTOIDS:  Clear.  MISCELLANEOUS:  No orbital or suprasellar abnormality noted.    IMPRESSION:    1)  chronic ischemic changes with atrophy, most prevalent in the frontal   temporal region.. No CT evidence of an acute infarct, hemorrhage, or mass.  2)  clear sinuses and mastoids..    Follow-up MR imaging of the brain may be considered for further   assessment.    --- End of Report ---            FERNANDO STEPHENS MD; Attending Radiologist  This document has been electronically signed. Feb 14 20245:33PM    < end of copied text >    < from: Xray Chest 1 View- PORTABLE-Urgent (02.14.24 @ 16:52) >    ACC: [id number] EXAM:  XR CHEST PORTABLE URGENT 1V   ORDERED BY: MAX   LAZARUS     PROCEDURE DATE:  02/14/2024          INTERPRETATION:  Portable AP chest radiographs    COMPARISON:  NONE.    CLINICAL INFORMATION: Sepsis.    FINDINGS:  CATHETERS AND TUBES: None    PULMONARY: The airway is midline.  There are no airspace consolidations or radiographic evidence of   pulmonary nodules..  No pleural effusion or pneumothorax.    HEART/VASCULAR: The heart size and mediastinum configuration are within   the limits of normal.    BONES: The visualized osseous thorax is intact.    IMPRESSION:    No radiographic evidence of active chest disease..    --- End of Report ---            SHAAN TEMPLE MD; Attending Radiologist  This document has been electronically signed. Feb 14 2024  5:12PM    < end of copied text >  Consultant(s) Notes Reviewed:   YES      Plan of care was discussed with patient and /or primary care giver; all questions and concerns were addressed

## 2024-02-16 NOTE — PROGRESS NOTE ADULT - PROBLEM SELECTOR PLAN 7
- Lovenox (renally dosed)    Discharge planning  -from [facility]  -Monitor Mental status  -f/u CTA perfusion  -f/u ID/neuro reccs. - Lovenox (renally dosed)    Discharge planning  -from [facility]  -Monitor Mental status  -f/u CT/CTA perfusion per neuro, give baclofen tonight and do imaging AM per dr. Thornton.   -f/u ID/neuro reccs.

## 2024-02-16 NOTE — CONSULT NOTE ADULT - SUBJECTIVE AND OBJECTIVE BOX
Patient is a 83y old  Female who presents with a chief complaint of AMS. (16 Feb 2024 10:21)      HPI:    Patient is a 82 y/o F who is legally blind and totally dependent for ADLS with PMH of HTN, HLD, PE, PAD, CVA with residual dysphagia?. dementia (AAOx1-2 at baseline) who was sent by NH for altered mental status. Patient is usually interactive and talking but this morning patient was found to be lethargic and minimally interactive. Due to this patient was sent to the hospital. Patient was seen at bedside but patient is minimally responsive and only making grunting sounds to painful stimuli. No ROS could be obtained from the patient due to altered mental status. PAST MEDICAL & SURGICAL HISTORY:  Hypertension      Hyperlipidemia      Dementia      Pulmonary embolism          FAMILY HISTORY:        Social Hx:  Nonsmoker, no drug or alcohol use    MEDICATIONS  (STANDING):  amLODIPine   Tablet 10 milliGRAM(s) Oral daily  atorvastatin 20 milliGRAM(s) Oral at bedtime  bacitracin   Ointment 1 Application(s) Topical two times a day  baclofen 10 milliGRAM(s) Oral every 12 hours  cefTRIAXone   IVPB 1000 milliGRAM(s) IV Intermittent every 24 hours  cholecalciferol 1000 Unit(s) Oral daily  enoxaparin Injectable 30 milliGRAM(s) SubCutaneous every 24 hours  folic acid 1 milliGRAM(s) Oral daily  lactated ringers. 1000 milliLiter(s) (100 mL/Hr) IV Continuous <Continuous>  latanoprost 0.005% Ophthalmic Solution 1 Drop(s) Both EYES at bedtime  multivitamin 1 Tablet(s) Oral daily  polyethylene glycol 3350 17 Gram(s) Oral daily  senna 2 Tablet(s) Oral at bedtime       Allergies    No Known Allergies    Intolerances        ROS: Pertinent positives in HPI, all other ROS were reviewed and are negative.      Vital Signs Last 24 Hrs  T(C): 36.3 (16 Feb 2024 13:00), Max: 36.9 (15 Feb 2024 16:23)  T(F): 97.4 (16 Feb 2024 13:00), Max: 98.5 (15 Feb 2024 16:23)  HR: 66 (16 Feb 2024 13:00) (66 - 125)  BP: 145/64 (16 Feb 2024 13:00) (145/64 - 196/89)  BP(mean): --  RR: 18 (16 Feb 2024 13:00) (16 - 18)  SpO2: 98% (16 Feb 2024 13:00) (93% - 98%)    Parameters below as of 16 Feb 2024 13:00  Patient On (Oxygen Delivery Method): room air            Constitutional: awake and alert.  HEENT: PERRLA, EOMI,   Neck: Supple.  Respiratory: Breath sounds are clear bilaterally  Cardiovascular: S1 and S2, regular rhythm  Gastrointestinal: soft, nontender  Extremities:  no edema  Vascular: Carotid Bruit - no  Musculoskeletal: no joint swelling/tenderness, no abnormal movements  Skin: No rashes    Neurological exam:  HF: Alert and awake but repeats only one syllable or a single word without meaning; able to indicate by non-verbal means what she wishes.   CN: KATIE, EOMI, VFF, facial sensation normal, no NLFD, tongue midline, Palate moves equally, SCM equal bilaterally  Motor: No pronator drift, Strength bilateral flexion attitude of both lower extremities and weakness of RUE   Sens: Intact to light touch / PP/   Reflexes: Symmetric and normal . BJ 2+, BR 2+, KJ 2+, AJ 2+, downgoing toes b/l  Coord:  No FNFA, dysmetria, MILTON intact   Gait/Balance: Cannot sit up or stand    NIHSS: 16  MRS 5  1A: Level of consciousness       0= Alert; keenly responsive  1B: Ask month and age       +2= Aphasic  1C: "Blink eyes" and "Squeeze Hands"       +1= Performs 1 task  2: Horizontal EOMs       0= Normal   3: Visual fields       0= No visual loss  4: Facial palsy (use grimace if obtunded)       +1= Minor paralysis (flat NLF, smile asymmetry)   5A: Left arm motor drift (count out loud and use fingers to show count)       +1= Drift but doesn't hit bed  5B: Right arm motor drift       +2= Some effort against gravity  6A: Left leg motor drift       +3= No effort against gravity  6B: Right leg motor drift       +3= No effort against gravity  7: Limb ataxia (FNF/heel-shin)       0= No ataxia  8: Sensation       0= Normal, no sensory loss  9: Language/aphasia- describe the scene (on analilia); name the items; read the sentences (on analilia)       +3= Mute/global aphasia: no usable speech/auditory comprehension  10: Dysarthria- read the words       0= Normal  11: Extinction/inattention       0= No abnormality          Labs:                        10.4   5.82  )-----------( 239      ( 16 Feb 2024 08:11 )             31.4     02-16    137  |  104  |  10  ----------------------------<  86  3.1<L>   |  24  |  0.63    Ca    9.3      16 Feb 2024 08:11  Phos  2.5     02-16  Mg     1.7     02-16    TPro  6.3  /  Alb  2.5<L>  /  TBili  0.4  /  DBili  x   /  AST  50<H>  /  ALT  29  /  AlkPhos  72  02-16        PT/INR - ( 14 Feb 2024 16:30 )   PT: 10.7 sec;   INR: 0.94 ratio         PTT - ( 14 Feb 2024 16:30 )  PTT:29.7 sec    Radiology report:  - CT head:    < from: CT Head No Cont (02.14.24 @ 17:29) >  ACC: [id number] EXAM:  CT BRAIN   ORDERED BY: MAX LAZARUS     PROCEDURE DATE:  02/14/2024          INTERPRETATION:  INDICATION:  Altered mental status.  TECHNIQUE:  A non contrast thin section axial CT study of the brain was   performed from skull base to vertex. Coronal and sagittal reformations   were generated from the axial data.  COMPARISON EXAMINATION:  No prior    FINDINGS:    HEMISPHERES:  Chronic ischemic changes are noted in the white matter of   both hemispheres with atrophy. This is more prominent on the left. There   is no CT evidence of an acute infarct, hemorrhage, or mass. There is   moderate to severe temporal lobe atrophy with moderate frontal volume   loss.  VENTRICLES:  Midline with ex vacuo enlargement  POSTERIOR FOSSA:  The brain stem and cerebellum are unremarkable.  No CP   angle lesion noted.  EXTRACEREBRAL SPACES:  No subdural or epidural collections are noted.  SKULL BASE AND CALVARIUM:  Appears intact.  No fracture or destructive   lesion is identified.  SINUSES AND MASTOIDS:  Clear.  MISCELLANEOUS:  No orbital or suprasellar abnormality noted.    IMPRESSION:    1)  chronic ischemic changes with atrophy, most prevalent in the frontal   temporal region.. No CT evidence of an acute infarct, hemorrhage, or mass.  2)  clear sinuses and mastoids..    Follow-up MR imaging of the brain may be considered for further   assessment.    --- End of Report ---            FERNANDO STEPHENS MD; Attending Radiologist  This document has been electronically signed. Feb 14 20245:33PM    < end of copied text >    Total Critical Care Time spent:

## 2024-02-16 NOTE — PROGRESS NOTE ADULT - ASSESSMENT
Patient is a 84 y/o F who is legally blind and totally dependent for ADLS with PMH of HTN, HLD, PE, PAD, CVA with residual dysphagia?. dementia (AAOx1-2 at baseline) who was sent by NH for altered mental status. Patient tested positive for a UTI. Patient is being admitted for acute encephalopathy 2/2 to UTI.  Ucx grew E coli. ID and Neuro consulted. started Baclofen per neurology. Neurology concerning possible  new left frontal ischemic infarct.  No IV tpa given because time of onset is unknown, reccs to start ASA/Plavix/statin, SLP/PT eval and  reccs to repeat CT CTA with perfusion or MRI MRA wo contrast of head.            Patient is a 84 y/o F who is legally blind and totally dependent for ADLS with PMH of HTN, HLD, PE, PAD, CVA with residual dysphagia?. dementia (AAOx1-2 at baseline) who was sent by NH for altered mental status. Patient tested positive for a UTI. Patient is being admitted for acute encephalopathy 2/2 to UTI.  Ucx grew E coli. ID and Neuro consulted. started Baclofen per neurology. Neurology concerning possible  new left frontal ischemic infarct.  No IV tpa given because time of onset is unknown, reccs to start ASA/Plavix/statin, SLP/PT eval and  reccs to repeat CT/CTA with perfusion or MRI MRA wo contrast of head.

## 2024-02-16 NOTE — SWALLOW BEDSIDE ASSESSMENT ADULT - SLP PERTINENT HISTORY OF CURRENT PROBLEM
Per chart review, Patient is a 82 y/o F who is legally blind and totally dependent for ADLS with PMH of HTN, HLD, PE, PAD, CVA with residual dysphagia?. dementia (AAOx1-2 at baseline) who was sent by NH for altered mental status. Patient is usually interactive and talking but this morning patient was found to be lethargic and minimally interactive. Due to this patient was sent to the hospital. Patient was seen at bedside but patient is minimally responsive and only making grunting sounds to painful stimuli.

## 2024-02-16 NOTE — SWALLOW BEDSIDE ASSESSMENT ADULT - ORAL PHASE
Decreased anterior-posterior movement of the bolus/Delayed oral transit time/Stasis in lateral sulci Delayed oral transit time

## 2024-02-16 NOTE — SWALLOW BEDSIDE ASSESSMENT ADULT - COMMENTS
Patient awake, alert, and confused with speech largely unintelligible. Patient unable to follow commands for formal oral [facility] exam.    Head CT: IMPRESSION:    1)  chronic ischemic changes with atrophy, most prevalent in the frontal   temporal region.. No CT evidence of an acute infarct, hemorrhage, or mass.  2)  clear sinuses and mastoids    CXR: Negative

## 2024-02-16 NOTE — SWALLOW BEDSIDE ASSESSMENT ADULT - PHARYNGEAL PHASE
No overt signs of aspiration/Delayed pharyngeal swallow/Multiple swallows No overt s/s of aspiration/Delayed pharyngeal swallow/Multiple swallows

## 2024-02-17 LAB
-  AMOXICILLIN/CLAVULANIC ACID: SIGNIFICANT CHANGE UP
-  AMPICILLIN/SULBACTAM: SIGNIFICANT CHANGE UP
-  AMPICILLIN: SIGNIFICANT CHANGE UP
-  AZTREONAM: SIGNIFICANT CHANGE UP
-  CEFAZOLIN: SIGNIFICANT CHANGE UP
-  CEFEPIME: SIGNIFICANT CHANGE UP
-  CEFOXITIN: SIGNIFICANT CHANGE UP
-  CEFTRIAXONE: SIGNIFICANT CHANGE UP
-  CEFUROXIME: SIGNIFICANT CHANGE UP
-  CIPROFLOXACIN: SIGNIFICANT CHANGE UP
-  ERTAPENEM: SIGNIFICANT CHANGE UP
-  GENTAMICIN: SIGNIFICANT CHANGE UP
-  IMIPENEM: SIGNIFICANT CHANGE UP
-  LEVOFLOXACIN: SIGNIFICANT CHANGE UP
-  MEROPENEM: SIGNIFICANT CHANGE UP
-  NITROFURANTOIN: SIGNIFICANT CHANGE UP
-  PIPERACILLIN/TAZOBACTAM: SIGNIFICANT CHANGE UP
-  TOBRAMYCIN: SIGNIFICANT CHANGE UP
-  TRIMETHOPRIM/SULFAMETHOXAZOLE: SIGNIFICANT CHANGE UP
ANION GAP SERPL CALC-SCNC: 6 MMOL/L — SIGNIFICANT CHANGE UP (ref 5–17)
BUN SERPL-MCNC: 13 MG/DL — SIGNIFICANT CHANGE UP (ref 7–18)
CALCIUM SERPL-MCNC: 9.7 MG/DL — SIGNIFICANT CHANGE UP (ref 8.4–10.5)
CHLORIDE SERPL-SCNC: 104 MMOL/L — SIGNIFICANT CHANGE UP (ref 96–108)
CO2 SERPL-SCNC: 24 MMOL/L — SIGNIFICANT CHANGE UP (ref 22–31)
CREAT SERPL-MCNC: 0.7 MG/DL — SIGNIFICANT CHANGE UP (ref 0.5–1.3)
CULTURE RESULTS: ABNORMAL
EGFR: 86 ML/MIN/1.73M2 — SIGNIFICANT CHANGE UP
GLUCOSE SERPL-MCNC: 110 MG/DL — HIGH (ref 70–99)
HCT VFR BLD CALC: 34.8 % — SIGNIFICANT CHANGE UP (ref 34.5–45)
HGB BLD-MCNC: 11.5 G/DL — SIGNIFICANT CHANGE UP (ref 11.5–15.5)
MCHC RBC-ENTMCNC: 29.5 PG — SIGNIFICANT CHANGE UP (ref 27–34)
MCHC RBC-ENTMCNC: 33 GM/DL — SIGNIFICANT CHANGE UP (ref 32–36)
MCV RBC AUTO: 89.2 FL — SIGNIFICANT CHANGE UP (ref 80–100)
METHOD TYPE: SIGNIFICANT CHANGE UP
NRBC # BLD: 0 /100 WBCS — SIGNIFICANT CHANGE UP (ref 0–0)
ORGANISM # SPEC MICROSCOPIC CNT: ABNORMAL
ORGANISM # SPEC MICROSCOPIC CNT: ABNORMAL
PLATELET # BLD AUTO: 260 K/UL — SIGNIFICANT CHANGE UP (ref 150–400)
POTASSIUM SERPL-MCNC: 4.2 MMOL/L — SIGNIFICANT CHANGE UP (ref 3.5–5.3)
POTASSIUM SERPL-SCNC: 4.2 MMOL/L — SIGNIFICANT CHANGE UP (ref 3.5–5.3)
RBC # BLD: 3.9 M/UL — SIGNIFICANT CHANGE UP (ref 3.8–5.2)
RBC # FLD: 13.3 % — SIGNIFICANT CHANGE UP (ref 10.3–14.5)
SODIUM SERPL-SCNC: 134 MMOL/L — LOW (ref 135–145)
SPECIMEN SOURCE: SIGNIFICANT CHANGE UP
WBC # BLD: 5.89 K/UL — SIGNIFICANT CHANGE UP (ref 3.8–10.5)
WBC # FLD AUTO: 5.89 K/UL — SIGNIFICANT CHANGE UP (ref 3.8–10.5)

## 2024-02-17 PROCEDURE — 0042T: CPT

## 2024-02-17 PROCEDURE — 70450 CT HEAD/BRAIN W/O DYE: CPT | Mod: 26,XU

## 2024-02-17 PROCEDURE — 70498 CT ANGIOGRAPHY NECK: CPT | Mod: 26

## 2024-02-17 PROCEDURE — 70496 CT ANGIOGRAPHY HEAD: CPT | Mod: 26

## 2024-02-17 RX ORDER — FLUTICASONE PROPIONATE 50 MCG
1 SPRAY, SUSPENSION NASAL
Refills: 0 | Status: DISCONTINUED | OUTPATIENT
Start: 2024-02-17 | End: 2024-02-23

## 2024-02-17 RX ADMIN — POLYETHYLENE GLYCOL 3350 17 GRAM(S): 17 POWDER, FOR SOLUTION ORAL at 11:13

## 2024-02-17 RX ADMIN — CEFTRIAXONE 100 MILLIGRAM(S): 500 INJECTION, POWDER, FOR SOLUTION INTRAMUSCULAR; INTRAVENOUS at 05:22

## 2024-02-17 RX ADMIN — SENNA PLUS 2 TABLET(S): 8.6 TABLET ORAL at 21:15

## 2024-02-17 RX ADMIN — CLOPIDOGREL BISULFATE 75 MILLIGRAM(S): 75 TABLET, FILM COATED ORAL at 11:13

## 2024-02-17 RX ADMIN — LATANOPROST 1 DROP(S): 0.05 SOLUTION/ DROPS OPHTHALMIC; TOPICAL at 21:15

## 2024-02-17 RX ADMIN — Medication 1000 UNIT(S): at 11:13

## 2024-02-17 RX ADMIN — Medication 1 MILLIGRAM(S): at 11:13

## 2024-02-17 RX ADMIN — Medication 1 APPLICATION(S): at 19:09

## 2024-02-17 RX ADMIN — ATORVASTATIN CALCIUM 20 MILLIGRAM(S): 80 TABLET, FILM COATED ORAL at 21:15

## 2024-02-17 RX ADMIN — Medication 10 MILLIGRAM(S): at 05:17

## 2024-02-17 RX ADMIN — Medication 1 APPLICATION(S): at 05:16

## 2024-02-17 RX ADMIN — ENOXAPARIN SODIUM 30 MILLIGRAM(S): 100 INJECTION SUBCUTANEOUS at 05:16

## 2024-02-17 RX ADMIN — Medication 81 MILLIGRAM(S): at 11:13

## 2024-02-17 RX ADMIN — Medication 1 TABLET(S): at 11:13

## 2024-02-17 RX ADMIN — Medication 10 MILLIGRAM(S): at 19:05

## 2024-02-17 RX ADMIN — Medication 1 SPRAY(S): at 23:59

## 2024-02-17 RX ADMIN — AMLODIPINE BESYLATE 10 MILLIGRAM(S): 2.5 TABLET ORAL at 05:17

## 2024-02-17 NOTE — DIETITIAN INITIAL EVALUATION ADULT - PERTINENT MEDS FT
MEDICATIONS  (STANDING):  amLODIPine   Tablet 10 milliGRAM(s) Oral daily  aspirin  chewable 81 milliGRAM(s) Oral daily  atorvastatin 20 milliGRAM(s) Oral at bedtime  bacitracin   Ointment 1 Application(s) Topical two times a day  baclofen 10 milliGRAM(s) Oral every 12 hours  cholecalciferol 1000 Unit(s) Oral daily  clopidogrel Tablet 75 milliGRAM(s) Oral daily  enoxaparin Injectable 30 milliGRAM(s) SubCutaneous every 24 hours  folic acid 1 milliGRAM(s) Oral daily  lactated ringers. 1000 milliLiter(s) (100 mL/Hr) IV Continuous <Continuous>  latanoprost 0.005% Ophthalmic Solution 1 Drop(s) Both EYES at bedtime  multivitamin 1 Tablet(s) Oral daily  polyethylene glycol 3350 17 Gram(s) Oral daily  senna 2 Tablet(s) Oral at bedtime    MEDICATIONS  (PRN):  acetaminophen     Tablet .. 650 milliGRAM(s) Oral every 6 hours PRN Temp greater or equal to 38C (100.4F), Mild Pain (1 - 3)  ondansetron Injectable 4 milliGRAM(s) IV Push every 8 hours PRN Nausea and/or Vomiting

## 2024-02-17 NOTE — PROGRESS NOTE ADULT - SUBJECTIVE AND OBJECTIVE BOX
Patient is a 83y old  Female who presents with a chief complaint of AMS. (16 Feb 2024 18:25)    PATIENT IS SEEN AND EXAMINED IN MEDICAL FLOOR.  SHANAET [    ]    SHIRLEY [   ]      GT [   ]    ALLERGIES:  No Known Allergies      Daily     Daily     VITALS:    Vital Signs Last 24 Hrs  T(C): 36.6 (17 Feb 2024 05:05), Max: 36.7 (16 Feb 2024 20:37)  T(F): 97.9 (17 Feb 2024 05:05), Max: 98 (16 Feb 2024 20:37)  HR: 91 (17 Feb 2024 05:05) (65 - 91)  BP: 135/77 (17 Feb 2024 05:05) (135/77 - 145/64)  BP(mean): --  RR: 18 (17 Feb 2024 05:05) (18 - 18)  SpO2: 97% (17 Feb 2024 05:05) (97% - 98%)    Parameters below as of 17 Feb 2024 05:05  Patient On (Oxygen Delivery Method): room air        LABS:    CBC Full  -  ( 17 Feb 2024 06:41 )  WBC Count : 5.89 K/uL  RBC Count : 3.90 M/uL  Hemoglobin : 11.5 g/dL  Hematocrit : 34.8 %  Platelet Count - Automated : 260 K/uL  Mean Cell Volume : 89.2 fl  Mean Cell Hemoglobin : 29.5 pg  Mean Cell Hemoglobin Concentration : 33.0 gm/dL  Auto Neutrophil # : x  Auto Lymphocyte # : x  Auto Monocyte # : x  Auto Eosinophil # : x  Auto Basophil # : x  Auto Neutrophil % : x  Auto Lymphocyte % : x  Auto Monocyte % : x  Auto Eosinophil % : x  Auto Basophil % : x      02-17    134<L>  |  104  |  13  ----------------------------<  110<H>  4.2   |  24  |  0.70    Ca    9.7      17 Feb 2024 06:41  Phos  2.5     02-16  Mg     1.7     02-16    TPro  6.3  /  Alb  2.5<L>  /  TBili  0.4  /  DBili  x   /  AST  50<H>  /  ALT  29  /  AlkPhos  72  02-16    CAPILLARY BLOOD GLUCOSE            LIVER FUNCTIONS - ( 16 Feb 2024 08:11 )  Alb: 2.5 g/dL / Pro: 6.3 g/dL / ALK PHOS: 72 U/L / ALT: 29 U/L DA / AST: 50 U/L / GGT: x           Creatinine Trend: 0.70<--, 0.63<--, 0.89<--, 1.58<--  I&O's Summary    16 Feb 2024 07:01  -  17 Feb 2024 07:00  --------------------------------------------------------  IN: 0 mL / OUT: 980 mL / NET: -980 mL            Clean Catch Clean Catch (Midstream)  02-14 @ 17:43   >100,000 CFU/ml Escherichia coli  --  Escherichia coli      .Blood Blood-Peripheral  02-14 @ 16:45   No growth at 48 Hours  --  --      .Blood Blood-Peripheral  02-14 @ 16:30   No growth at 48 Hours  --  --          MEDICATIONS:    MEDICATIONS  (STANDING):  amLODIPine   Tablet 10 milliGRAM(s) Oral daily  aspirin  chewable 81 milliGRAM(s) Oral daily  atorvastatin 20 milliGRAM(s) Oral at bedtime  bacitracin   Ointment 1 Application(s) Topical two times a day  baclofen 10 milliGRAM(s) Oral every 12 hours  cholecalciferol 1000 Unit(s) Oral daily  clopidogrel Tablet 75 milliGRAM(s) Oral daily  enoxaparin Injectable 30 milliGRAM(s) SubCutaneous every 24 hours  folic acid 1 milliGRAM(s) Oral daily  lactated ringers. 1000 milliLiter(s) (100 mL/Hr) IV Continuous <Continuous>  latanoprost 0.005% Ophthalmic Solution 1 Drop(s) Both EYES at bedtime  multivitamin 1 Tablet(s) Oral daily  polyethylene glycol 3350 17 Gram(s) Oral daily  senna 2 Tablet(s) Oral at bedtime      MEDICATIONS  (PRN):  acetaminophen     Tablet .. 650 milliGRAM(s) Oral every 6 hours PRN Temp greater or equal to 38C (100.4F), Mild Pain (1 - 3)  ondansetron Injectable 4 milliGRAM(s) IV Push every 8 hours PRN Nausea and/or Vomiting      REVIEW OF SYSTEMS:                           ALL ROS DONE [ X   ]    CONSTITUTIONAL:  LETHARGIC [   ], FEVER [   ], UNRESPONSIVE [   ]  CVS:  CP  [   ], SOB, [   ], PALPITATIONS [   ], DIZZYNESS [   ]  RS: COUGH [   ], SPUTUM [   ]  GI: ABDOMINAL PAIN [   ], NAUSEA [   ], VOMITINGS [   ], DIARRHEA [   ], CONSTIPATION [   ]  :  DYSURIA [   ], NOCTURIA [   ], INCREASED FREQUENCY [   ], DRIBLING [   ],  SKELETAL: PAINFUL JOINTS [   ], SWOLLEN JOINTS [   ], NECK ACHE [   ], LOW BACK ACHE [   ],  SKIN : ULCERS [   ], RASH [   ], ITCHING [   ]  CNS: HEAD ACHE [   ], DOUBLE VISION [   ], BLURRED VISION [   ], AMS / CONFUSION [   ], SEIZURES [   ], WEAKNESS [   ],TINGLING / NUMBNESS [   ]      PHYSICAL EXAMINATION:  GENERAL APPEARANCE: NO DISTRESS  HEENT:  NO PALLOR, NO  JVD,  NO   NODES, NECK SUPPLE  CVS: S1 +, S2 +,   RS: AEEB,  OCCASIONAL  RALES +,   NO RONCHI  ABD: SOFT, NT, NO, BS +  EXT: NO PE  SKIN: WARM,   SKELETAL:  ROM ACCEPTABLE [ WHEN EXAMINER MOVES ARMS/LEGS, BUT IS NOT CONSISTENTLY PARTICIPATING IN EXAM - ?DUE TO DEMENTIA]  CNS:  AAO X 1    RADIOLOGY :    RADIOLOGY AND READINGS REVIEWED    ASSESSMENT :     Altered mental status    Hypertension    Hyperlipidemia    Dementia    Pulmonary embolism        PLAN:  HPI:  Patient is a 82 y/o F who is legally blind and totally dependent for ADLS with PMH of HTN, HLD, PE, PAD, CVA with residual dysphagia?. dementia (AAOx1-2 at baseline) who was sent by NH for altered mental status. Patient is usually interactive and talking but this morning patient was found to be lethargic and minimally interactive. Due to this patient was sent to the hospital. Patient was seen at bedside but patient is minimally responsive and only making grunting sounds to painful stimuli. No ROS could be obtained from the patient due to altered mental status.    (14 Feb 2024 21:33)    # CASE D/W  AT BEDSIDE    # SEPSIS S/T UTI  - PLACED ON ROCEPHIN, F/U BCX AND UCX  - ID CONSULT    # ACUTE ENCEPHALOPATHY ON CHRONIC DEMENTIA  - NOTED CT HEAD  - RECOMMENDED FOR REPEAT CTA HEAD AND NECK VS. MRA HEAD/NECK + MR BRAIN PER NEUROLOGY  - F/U PT EVAL  - F/U ST EVAL  - ASA + PLAVIX  - NOTED UTOX, ABG  - NEUROLOGY CONSULT    # NOAH  - IMPROVED S/P IVF    # AMBULATORY DYSFUNCTION S/T OA, OP, HX OF ?CVA  - F/U PT EVAL    # VITAMIN D DEFICIENCY  - REPLETE WITH SUPPLEMENT    # ? HX OF CVA  # ? HX OF PE IN RLL IN 2022 - ? UNTREATED DUE TO RECURRENT FALLS     # HTN  # HLD  # LEGALLY BLIND, GLAUCOMA  # GI AND DVT PPX   Patient is a 83y old  Female who presents with a chief complaint of AMS. (16 Feb 2024 18:25)    PATIENT IS SEEN AND EXAMINED IN MEDICAL FLOOR.      ALLERGIES:  No Known Allergies      VITALS:    Vital Signs Last 24 Hrs  T(C): 36.6 (17 Feb 2024 05:05), Max: 36.7 (16 Feb 2024 20:37)  T(F): 97.9 (17 Feb 2024 05:05), Max: 98 (16 Feb 2024 20:37)  HR: 91 (17 Feb 2024 05:05) (65 - 91)  BP: 135/77 (17 Feb 2024 05:05) (135/77 - 145/64)  BP(mean): --  RR: 18 (17 Feb 2024 05:05) (18 - 18)  SpO2: 97% (17 Feb 2024 05:05) (97% - 98%)    Parameters below as of 17 Feb 2024 05:05  Patient On (Oxygen Delivery Method): room air        LABS:    CBC Full  -  ( 17 Feb 2024 06:41 )  WBC Count : 5.89 K/uL  RBC Count : 3.90 M/uL  Hemoglobin : 11.5 g/dL  Hematocrit : 34.8 %  Platelet Count - Automated : 260 K/uL  Mean Cell Volume : 89.2 fl  Mean Cell Hemoglobin : 29.5 pg  Mean Cell Hemoglobin Concentration : 33.0 gm/dL  Auto Neutrophil # : x  Auto Lymphocyte # : x  Auto Monocyte # : x  Auto Eosinophil # : x  Auto Basophil # : x  Auto Neutrophil % : x  Auto Lymphocyte % : x  Auto Monocyte % : x  Auto Eosinophil % : x  Auto Basophil % : x      02-17    134<L>  |  104  |  13  ----------------------------<  110<H>  4.2   |  24  |  0.70    Ca    9.7      17 Feb 2024 06:41  Phos  2.5     02-16  Mg     1.7     02-16    TPro  6.3  /  Alb  2.5<L>  /  TBili  0.4  /  DBili  x   /  AST  50<H>  /  ALT  29  /  AlkPhos  72  02-16    CAPILLARY BLOOD GLUCOSE            LIVER FUNCTIONS - ( 16 Feb 2024 08:11 )  Alb: 2.5 g/dL / Pro: 6.3 g/dL / ALK PHOS: 72 U/L / ALT: 29 U/L DA / AST: 50 U/L / GGT: x           Creatinine Trend: 0.70<--, 0.63<--, 0.89<--, 1.58<--  I&O's Summary    16 Feb 2024 07:01  -  17 Feb 2024 07:00  --------------------------------------------------------  IN: 0 mL / OUT: 980 mL / NET: -980 mL            Clean Catch Clean Catch (Midstream)  02-14 @ 17:43   >100,000 CFU/ml Escherichia coli  --  Escherichia coli      .Blood Blood-Peripheral  02-14 @ 16:45   No growth at 48 Hours  --  --      .Blood Blood-Peripheral  02-14 @ 16:30   No growth at 48 Hours  --  --          MEDICATIONS:    MEDICATIONS  (STANDING):  amLODIPine   Tablet 10 milliGRAM(s) Oral daily  aspirin  chewable 81 milliGRAM(s) Oral daily  atorvastatin 20 milliGRAM(s) Oral at bedtime  bacitracin   Ointment 1 Application(s) Topical two times a day  baclofen 10 milliGRAM(s) Oral every 12 hours  cholecalciferol 1000 Unit(s) Oral daily  clopidogrel Tablet 75 milliGRAM(s) Oral daily  enoxaparin Injectable 30 milliGRAM(s) SubCutaneous every 24 hours  folic acid 1 milliGRAM(s) Oral daily  lactated ringers. 1000 milliLiter(s) (100 mL/Hr) IV Continuous <Continuous>  latanoprost 0.005% Ophthalmic Solution 1 Drop(s) Both EYES at bedtime  multivitamin 1 Tablet(s) Oral daily  polyethylene glycol 3350 17 Gram(s) Oral daily  senna 2 Tablet(s) Oral at bedtime      MEDICATIONS  (PRN):  acetaminophen     Tablet .. 650 milliGRAM(s) Oral every 6 hours PRN Temp greater or equal to 38C (100.4F), Mild Pain (1 - 3)  ondansetron Injectable 4 milliGRAM(s) IV Push every 8 hours PRN Nausea and/or Vomiting      REVIEW OF SYSTEMS:                           ALL ROS DONE [ X   ]    CONSTITUTIONAL:  LETHARGIC [   ], FEVER [   ], UNRESPONSIVE [   ]  CVS:  CP  [   ], SOB, [   ], PALPITATIONS [   ], DIZZYNESS [   ]  RS: COUGH [   ], SPUTUM [   ]  GI: ABDOMINAL PAIN [   ], NAUSEA [   ], VOMITINGS [   ], DIARRHEA [   ], CONSTIPATION [   ]  :  DYSURIA [   ], NOCTURIA [   ], INCREASED FREQUENCY [   ], DRIBLING [   ],  SKELETAL: PAINFUL JOINTS [   ], SWOLLEN JOINTS [   ], NECK ACHE [   ], LOW BACK ACHE [   ],  SKIN : ULCERS [   ], RASH [   ], ITCHING [   ]  CNS: HEAD ACHE [   ], DOUBLE VISION [   ], BLURRED VISION [   ], AMS / CONFUSION [   ], SEIZURES [   ], WEAKNESS [   ],TINGLING / NUMBNESS [   ]      PHYSICAL EXAMINATION:  GENERAL APPEARANCE: NO DISTRESS  HEENT:  NO PALLOR, NO  JVD,  NO   NODES, NECK SUPPLE  CVS: S1 +, S2 +,   RS: AEEB,  OCCASIONAL  RALES +,   NO RONCHI  ABD: SOFT, NT, NO, BS +  EXT: NO PE  SKIN: WARM,   SKELETAL:  ROM ACCEPTABLE [ WHEN EXAMINER MOVES ARMS/LEGS, BUT IS NOT CONSISTENTLY PARTICIPATING IN EXAM - ?DUE TO DEMENTIA]  CNS:  AAO X 1    RADIOLOGY :    RADIOLOGY AND READINGS REVIEWED    ASSESSMENT :     Altered mental status    Hypertension    Hyperlipidemia    Dementia    Pulmonary embolism        PLAN:  HPI:  Patient is a 84 y/o F who is legally blind and totally dependent for ADLS with PMH of HTN, HLD, PE, PAD, CVA with residual dysphagia?. dementia (AAOx1-2 at baseline) who was sent by NH for altered mental status. Patient is usually interactive and talking but this morning patient was found to be lethargic and minimally interactive. Due to this patient was sent to the hospital. Patient was seen at bedside but patient is minimally responsive and only making grunting sounds to painful stimuli. No ROS could be obtained from the patient due to altered mental status.    (14 Feb 2024 21:33)      # SEPSIS S/T UTI  - PLACED ON ROCEPHIN, F/U BCX [NGTD] AND UCX [ECOLI]  - ID CONSULT    # ACUTE ENCEPHALOPATHY ON CHRONIC DEMENTIA  - NOTED CT HEAD  - RECOMMENDED FOR REPEAT CTA HEAD AND NECK VS. MRA HEAD/NECK + MR BRAIN PER NEUROLOGY  - F/U PT EVAL  - F/U ST EVAL  - ASA + PLAVIX  - NOTED UTOX, ABG  - NEUROLOGY CONSULT    # CONSTIPATION, FECAL IMPACTION  - BOWEL REGIMEN  - PLANNED FOR ENEMA    # NOAH  - IMPROVED S/P IVF    # AMBULATORY DYSFUNCTION S/T OA, OP, HX OF ?CVA  - F/U PT EVAL    # VITAMIN D DEFICIENCY  - REPLETE WITH SUPPLEMENT    # ? HX OF CVA  # ? HX OF PE IN RLL IN 2022 - ? UNTREATED DUE TO RECURRENT FALLS     # HTN  # HLD  # LEGALLY BLIND, GLAUCOMA  # GI AND DVT PPX

## 2024-02-17 NOTE — DIETITIAN INITIAL EVALUATION ADULT - PERTINENT LABORATORY DATA
02-17    134<L>  |  104  |  13  ----------------------------<  110<H>  4.2   |  24  |  0.70    Ca    9.7      17 Feb 2024 06:41  Phos  2.5     02-16  Mg     1.7     02-16    TPro  6.3  /  Alb  2.5<L>  /  TBili  0.4  /  DBili  x   /  AST  50<H>  /  ALT  29  /  AlkPhos  72  02-16

## 2024-02-17 NOTE — DIETITIAN INITIAL EVALUATION ADULT - PROBLEM/PLAN-5
Requested Prescriptions   Pending Prescriptions Disp Refills     predniSONE (DELTASONE) 1 MG tablet [Pharmacy Med Name: PREDNISONE  Last Written Prescription Date:  5/2/2019  Last Fill Quantity: 200,  # refills: 2   Last office visit: 7/30/2019 with prescribing provider:     Future Office Visit:   1MG TABLETS] 200 tablet 0     Sig: TAKE 5 TABLETS BY MOUTH EVERY DAY AND ALTERNATING WITH 6 TABLETS THE NEXT.       There is no refill protocol information for this order         DISPLAY PLAN FREE TEXT

## 2024-02-17 NOTE — DIETITIAN NUTRITION RISK NOTIFICATION - TREATMENT: THE FOLLOWING DIET HAS BEEN RECOMMENDED
Diet, Soft and Bite Sized:   DASH/TLC {Sodium & Cholesterol Restricted} (02-15-24 @ 18:40) [Active]

## 2024-02-17 NOTE — DIETITIAN INITIAL EVALUATION ADULT - NSFNSPHYEXAMSKINFT_GEN_A_CORE
Pressure Injury 1: coccyx, Stage II  Pressure Injury 2: Bilateral:, heel, Stage I  Pressure Injury 3: none, none  Pressure Injury 4: none, none  Pressure Injury 5: none, none  Pressure Injury 6: none, none  Pressure Injury 7: none, none  Pressure Injury 8: none, none  Pressure Injury 9: none, none  Pressure Injury 10: none, none  Pressure Injury 11: none, none, Pressure Injury 1: coccyx, Stage II  Pressure Injury 2: Bilateral:, heel, Stage I  Pressure Injury 3: none, none  Pressure Injury 4: none, none  Pressure Injury 5: none, none  Pressure Injury 6: none, none  Pressure Injury 7: none, none  Pressure Injury 8: none, none  Pressure Injury 9: none, none  Pressure Injury 10: none, none  Pressure Injury 11: none, none

## 2024-02-17 NOTE — DIETITIAN INITIAL EVALUATION ADULT - MALNUTRITION
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

## 2024-02-17 NOTE — DIETITIAN INITIAL EVALUATION ADULT - PROBLEM SELECTOR PLAN 2
- P/w lethargy and found to be minimally responsive.   - Presented with hypotension (88/55) which resolved with IV fluids.   - UA +ve.   - S/p zosyn and vancomycin in ED.   - s/p 2.5L in ED.   - Start ceftriaxone.   - C/w IV fluids.   - F/U Ucx.   - F/U Bcx.  - Consulted ID - Dr Mejia

## 2024-02-17 NOTE — DIETITIAN INITIAL EVALUATION ADULT - PROBLEM SELECTOR PLAN 1
- P/w lethargy and found to be minimally responsive.   - CT head: chronic ischemic changes with atrophy, most prevalent in the frontal temporal region.. No CT evidence of an acute infarct, hemorrhage, or mass.  - Normal Na, K, blood glucose and PCO2.   - Mildly elevated corrected calcium - 10.8.   - UA +ve.   - Likely AMS 2/2 to UTI vs hypercalcemia.   - C/w IV fluids, treat underlying infection.   - F/U Utox.   - Consulted neurology - Dr. Thornton

## 2024-02-17 NOTE — DIETITIAN INITIAL EVALUATION ADULT - OTHER INFO
Pt s/p SLP2/16. Pt visited at dinner.  trying to feed (barely taking anything) he reports she took Ensure when she was at home (likes vanilla flavor) Also prefers coffee to tea. PTA pt identified w/ malnutrition (facility form). Pt noted to be picked up tomorrow

## 2024-02-18 LAB
ANION GAP SERPL CALC-SCNC: 5 MMOL/L — SIGNIFICANT CHANGE UP (ref 5–17)
BUN SERPL-MCNC: 14 MG/DL — SIGNIFICANT CHANGE UP (ref 7–18)
CALCIUM SERPL-MCNC: 9.4 MG/DL — SIGNIFICANT CHANGE UP (ref 8.4–10.5)
CHLORIDE SERPL-SCNC: 106 MMOL/L — SIGNIFICANT CHANGE UP (ref 96–108)
CO2 SERPL-SCNC: 24 MMOL/L — SIGNIFICANT CHANGE UP (ref 22–31)
CREAT SERPL-MCNC: 0.74 MG/DL — SIGNIFICANT CHANGE UP (ref 0.5–1.3)
EGFR: 80 ML/MIN/1.73M2 — SIGNIFICANT CHANGE UP
GLUCOSE SERPL-MCNC: 114 MG/DL — HIGH (ref 70–99)
HCT VFR BLD CALC: 31 % — LOW (ref 34.5–45)
HGB BLD-MCNC: 10.5 G/DL — LOW (ref 11.5–15.5)
MCHC RBC-ENTMCNC: 29.7 PG — SIGNIFICANT CHANGE UP (ref 27–34)
MCHC RBC-ENTMCNC: 33.9 GM/DL — SIGNIFICANT CHANGE UP (ref 32–36)
MCV RBC AUTO: 87.8 FL — SIGNIFICANT CHANGE UP (ref 80–100)
NRBC # BLD: 0 /100 WBCS — SIGNIFICANT CHANGE UP (ref 0–0)
PLATELET # BLD AUTO: 261 K/UL — SIGNIFICANT CHANGE UP (ref 150–400)
POTASSIUM SERPL-MCNC: 3.7 MMOL/L — SIGNIFICANT CHANGE UP (ref 3.5–5.3)
POTASSIUM SERPL-SCNC: 3.7 MMOL/L — SIGNIFICANT CHANGE UP (ref 3.5–5.3)
RBC # BLD: 3.53 M/UL — LOW (ref 3.8–5.2)
RBC # FLD: 13.4 % — SIGNIFICANT CHANGE UP (ref 10.3–14.5)
SODIUM SERPL-SCNC: 135 MMOL/L — SIGNIFICANT CHANGE UP (ref 135–145)
WBC # BLD: 6.65 K/UL — SIGNIFICANT CHANGE UP (ref 3.8–10.5)
WBC # FLD AUTO: 6.65 K/UL — SIGNIFICANT CHANGE UP (ref 3.8–10.5)

## 2024-02-18 PROCEDURE — 99232 SBSQ HOSP IP/OBS MODERATE 35: CPT

## 2024-02-18 RX ADMIN — AMLODIPINE BESYLATE 10 MILLIGRAM(S): 2.5 TABLET ORAL at 05:26

## 2024-02-18 RX ADMIN — Medication 1 TABLET(S): at 11:14

## 2024-02-18 RX ADMIN — CLOPIDOGREL BISULFATE 75 MILLIGRAM(S): 75 TABLET, FILM COATED ORAL at 11:14

## 2024-02-18 RX ADMIN — Medication 81 MILLIGRAM(S): at 11:14

## 2024-02-18 RX ADMIN — ATORVASTATIN CALCIUM 20 MILLIGRAM(S): 80 TABLET, FILM COATED ORAL at 21:28

## 2024-02-18 RX ADMIN — ENOXAPARIN SODIUM 30 MILLIGRAM(S): 100 INJECTION SUBCUTANEOUS at 05:27

## 2024-02-18 RX ADMIN — Medication 1 SPRAY(S): at 17:18

## 2024-02-18 RX ADMIN — Medication 650 MILLIGRAM(S): at 21:30

## 2024-02-18 RX ADMIN — Medication 1 APPLICATION(S): at 05:27

## 2024-02-18 RX ADMIN — Medication 1 SPRAY(S): at 05:27

## 2024-02-18 RX ADMIN — Medication 10 MILLIGRAM(S): at 05:27

## 2024-02-18 RX ADMIN — Medication 1000 UNIT(S): at 11:14

## 2024-02-18 RX ADMIN — SENNA PLUS 2 TABLET(S): 8.6 TABLET ORAL at 21:28

## 2024-02-18 RX ADMIN — LATANOPROST 1 DROP(S): 0.05 SOLUTION/ DROPS OPHTHALMIC; TOPICAL at 21:28

## 2024-02-18 RX ADMIN — Medication 650 MILLIGRAM(S): at 22:00

## 2024-02-18 RX ADMIN — Medication 1 APPLICATION(S): at 17:19

## 2024-02-18 RX ADMIN — Medication 1 MILLIGRAM(S): at 11:14

## 2024-02-18 NOTE — PROGRESS NOTE ADULT - ASSESSMENT
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 Continue supportive care; recommend palliative care because of poor prognosis

## 2024-02-18 NOTE — PROGRESS NOTE ADULT - CONVERSATION DETAILS
CASE DISCUSSED AT LENGTH WITH PATIENT'S  JAREK AND FRIEND AT BEDSIDE. ALL QUESTIONS ANSWERED. DISCUSSED REGARDING PATIENT'S ADVANCED DEMENTIA, DECLINING MOBILITY, DECLINING NUTRITIONAL STATUS. DISCUSSED THAT PATIENT'S PROGNOSIS IS POOR.  FAMILY VERBALIZED UNDERSTANDING. REGARDING GOALS OF CARE FAMILY WOULD LIKE TO DISCUSS AMONGST THEMSELVES. PALLIATIVE CARE CONSULT.

## 2024-02-18 NOTE — PROGRESS NOTE ADULT - SUBJECTIVE AND OBJECTIVE BOX
INTERVAL HPI/OVERNIGHT EVENTS:    HEALTH ISSUES - PROBLEM Dx:  Acute metabolic encephalopathy    Acute UTI    Hypertension    Hyperlipidemia    Prophylactic measure    Hypercalcemia    Hypokalemia            MEDICATIONS  (STANDING):  amLODIPine   Tablet 10 milliGRAM(s) Oral daily  aspirin  chewable 81 milliGRAM(s) Oral daily  atorvastatin 20 milliGRAM(s) Oral at bedtime  bacitracin   Ointment 1 Application(s) Topical two times a day  baclofen 10 milliGRAM(s) Oral every 12 hours  cholecalciferol 1000 Unit(s) Oral daily  clopidogrel Tablet 75 milliGRAM(s) Oral daily  enoxaparin Injectable 30 milliGRAM(s) SubCutaneous every 24 hours  fluticasone propionate 50 MICROgram(s)/spray Nasal Spray 1 Spray(s) Both Nostrils two times a day  folic acid 1 milliGRAM(s) Oral daily  lactated ringers. 1000 milliLiter(s) (100 mL/Hr) IV Continuous <Continuous>  latanoprost 0.005% Ophthalmic Solution 1 Drop(s) Both EYES at bedtime  multivitamin 1 Tablet(s) Oral daily  polyethylene glycol 3350 17 Gram(s) Oral daily  senna 2 Tablet(s) Oral at bedtime    MEDICATIONS  (PRN):  acetaminophen     Tablet .. 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  HEENT: PERRLA, EOMI,   Neck: Supple.  Respiratory: Breath sounds are clear bilaterally  Cardiovascular: S1 and S2, regular rhythm  Gastrointestinal: soft, nontender  Extremities:  no edema  Vascular: Carotid Bruit - no  Musculoskeletal: no joint swelling/tenderness, no abnormal movements  Skin: No rashes    Neurological exam:  HF: Alert and awake but repeats only one syllable or a single word without meaning; able to indicate by non-verbal means what she wishes.   CN: KATIE, EOMI, VFF, facial sensation normal, no NLFD, tongue midline, Palate moves equally, SCM equal bilaterally  Motor: No pronator drift, Strength bilateral flexion attitude of both lower extremities and weakness of RUE   Sens: Intact to light touch / PP/   Reflexes: Symmetric and normal . BJ 2+, BR 2+, KJ 2+, AJ 2+, downgoing toes b/l  Coord:  No FNFA, dysmetria, MILTON intact   Gait/Balance: Cannot sit up or stand    NIHSS: 16  MRS 5  1A: Level of consciousness       0= Alert; keenly responsive  1B: Ask month and age       +2= Aphasic  1C: "Blink eyes" and "Squeeze Hands"       +1= Performs 1 task  2: Horizontal EOMs       0= Normal   3: Visual fields       0= No visual loss  4: Facial palsy (use grimace if obtunded)       +1= Minor paralysis (flat NLF, smile asymmetry)   5A: Left arm motor drift (count out loud and use fingers to show count)       +1= Drift but doesn't hit bed  5B: Right arm motor drift       +2= Some effort against gravity  6A: Left leg motor drift       +3= No effort against gravity  6B: Right leg motor drift       +3= No effort against gravity  7: Limb ataxia (FNF/heel-shin)       0= No ataxia  8: Sensation       0= Normal, no sensory loss  9: Language/aphasia- describe the scene (on analilia); name the items; read the sentences (on analilia)       +3= Mute/global aphasia: no usable speech/auditory comprehension  10: Dysarthria- read the words       0= Normal  11: Extinction/inattention       0= No abnormality          LABS:                        10.5   6.65  )-----------( 261      ( 18 Feb 2024 06:10 )             31.0     02-18    135  |  106  |  14  ----------------------------<  114<H>  3.7   |  24  |  0.74    Ca    9.4      18 Feb 2024 06:10        Urinalysis Basic - ( 18 Feb 2024 06:10 )    Color: x / Appearance: x / SG: x / pH: x  Gluc: 114 mg/dL / Ketone: x  / Bili: x / Urobili: x   Blood: x / Protein: x / Nitrite: x   Leuk Esterase: x / RBC: x / WBC x   Sq Epi: x / Non Sq Epi: x / Bacteria: x        RADIOLOGY & ADDITIONAL TESTS:  < from: CT Angio Head w/ IV Cont (02.17.24 @ 18:17) >    ACC: [id number] EXAM:  CT ANGIO BRAIN (W)AW IC   ORDERED BY: SUHA SEGURA     ACC: [id number] EXAM:  CT ANGIO NECK (W)AW IC   ORDERED BY: NIMESH CAZARES     ACC: [id number] EXAM:  CT PERFUSION W MAPS IC   ORDERED BY: NIMESH CAZARES     PROCEDURE DATE:  02/17/2024          INTERPRETATION:  CLINICAL INFORMATION: Altered mental status.    COMPARISON: Noncontrast head CT scans from 2/17/2024 and 2/14/2024.    CONTRAST/COMPLICATIONS:  IV Contrast: Omnipaque 350.  130 cc administered.  70 cc discarded  Complications: None reported at time of study completion    TECHNIQUE:  1.  Contrast-enhanced CT perfusion of the brain was performed.  Perfusion   maps were automatically generated using [employer]ID software.  2.  Contrast enhanced CT angiography of the neck and head was performed.    3D and MIP reformats were generated.    FINDINGS:  CT PERFUSION:  There is no evidence of a core infarct or penumbra of ischemic tissue.    Trace CBF abnormality, with volume of 6 mL, located along the surface of   the left cerebral hemisphere appears to be outside the brain parenchyma   and located within CSF; this is compatible with artifact.    The following measurements were obtained on perfusion maps:  Core infarct (CBF <  30%:): 6 mL  Penumbra (Tmax>6s): 0 mL  Mismatched volume: -6 mL  Mismatched ratio: None    CT ANGIOGRAPHY NECK:  Thoracic aorta and branch vessels: Three-vessel arch with mild   atherosclerotic calcification..  No occlusion, flow limiting stenosis or   dissection.    Cervical carotid systems: The common carotid arteries, internal carotid   arteries and external carotid arteries are patent bilaterally without   evidence of stenosis or dissection..  There is minimal atherosclerotic   calcification at the right carotid bifurcation and mild atherosclerotic   calcification of the left carotid bifurcation.  No hemodynamically   significant carotid stenosis using NASCET criteria.    Vertebral arteries: The vertebral arteries are patent bilaterally without   evidence of atherosclerosis, stenosis or dissection.  The right vertebral   artery is dominant.  The left vertebral artery is noted to arise off the   proximal left subclavian artery within the superior mediastinum.    Soft tissues of the neck: There are punctate tonsilloliths in the   palatine tonsils.  A few tiny subcentimeter thyroid nodules are   visualized..    Cervical spine: Straightening of the cervical lordosis with mild   degenerative changes.  Small disc osteophyte complexes at C4-C5, C5-C6   and C6-C7.Mild spinal canal stenosis at C4-C5 and six.    Uncovertebral/facet hypertrophy causes mild right-sided neural foramina   at C3-C4; and causes moderate bilateral neural foraminal narrowing at   C4-C5, C5-C6 and C6-C7.    Visualized upper chest:  Secretions in the trachea.  Mild pleural   parenchymal scarring in the lung apices.  Punctate calcified granuloma in   the right upper lobe (6:594).  Trace dependent atelectasis partially   visualized in the right lung.    CT ANGIOGRAPHY BRAIN:  Internal carotid arteries: There is mild atherosclerotic calcification in   the distal precavernous segment of the left internal carotid artery and   in the cavernous and clinoid/proximal cervical carotid segments of both   internal carotid arteries.  No, flow-limiting stenosis or aneurysm.    Anterior cerebral arteries: No occlusion, flow-limiting stenosis or   aneurysm.  The A1 segment of the right anterior cerebral artery is   hypoplastic and the right A2 segment primarily fills across the anterior   communicating artery.    Middle cerebral arteries: No occlusion, flow-limiting stenosis or   aneurysm.    Anterior communicating artery: Patent.  No aneurysm.  Right posterior communicating artery: Patent.  No aneurysm  Left posterior communicating artery: Patent.  No aneurysm    Posterior cerebral arteries: There are moderate stenoses in the proximal   P2 segments of both posterior cerebral arteries with good distal flow   (7:40).  No occlusion or aneurysm.    Vertebrobasilar: No occlusion, flow-limiting stenosis or aneurysm.  The   distal right vertebral artery is dominant..  Bilateral posterior inferior   cerebellar arteries, a left anterior inferior cerebellar artery and   bilateral superior cerebellar arteries are visualized.  The left   posterior inferior cerebellar artery is noted to arise off the proximal   basilar artery.    Venous sinuses: The superficial and deep venous systems are patent.    Brain: No abnormal early arterial phase enhancement on CTA images.  No   evidence of a large arteriovenous malformation.    IMPRESSION:    CT PERFUSION: No core infarct or penumbra of ischemic tissue is   identified by CT perfusion.    CT ANGIOGRAPHY NECK:  1.  The cervical carotid systems and vertebral arteries are patent   bilaterally without evidence of stenosis or dissection.  2.  There is mild atherosclerotic calcification at the left carotid   bifurcation and minimal atherosclerotic calcification the right carotid   bifurcation.  No hemodynamically significant carotid stenosis using   NASCET criteria.  3.  Secretions in the trachea.    CT ANGIOGRAPHY BRAIN:  1. No major vessel occlusion or aneurysm is identified about the [place]   [place].  2.  There are atherosclerotic calcifications in the intracranial internal   carotidarteries without flow-limiting stenosis.  3.  There are moderate stenoses in the proximal P2 segments of both   posterior cerebral arteries with good distal flow.  4.  No evidence of dural venous sinus thrombosis or an arteriovenous   malformation.    --- End of Report ---            DOROTA GÓMEZ MD; Attending Radiologist  This document has been electronically signed. Feb 17 2024  7:20PM    < end of copied text >

## 2024-02-18 NOTE — PROGRESS NOTE ADULT - SUBJECTIVE AND OBJECTIVE BOX
Patient is a 83y old  Female who presents with a chief complaint of Altered mental status     (17 Feb 2024 18:32)    PATIENT IS SEEN AND EXAMINED IN MEDICAL FLOOR.  SHANAET [    ]    SHIRLEY [   ]      GT [   ]    ALLERGIES:  No Known Allergies      Daily Height in cm: 167.6 (17 Feb 2024 18:32)    Daily     VITALS:    Vital Signs Last 24 Hrs  T(C): 36.5 (18 Feb 2024 05:40), Max: 36.9 (17 Feb 2024 13:03)  T(F): 97.7 (18 Feb 2024 05:40), Max: 98.4 (17 Feb 2024 13:03)  HR: 88 (18 Feb 2024 05:40) (80 - 99)  BP: 139/78 (18 Feb 2024 05:40) (135/66 - 145/68)  BP(mean): --  RR: 17 (18 Feb 2024 05:40) (17 - 17)  SpO2: 97% (18 Feb 2024 05:40) (94% - 98%)    Parameters below as of 18 Feb 2024 05:40  Patient On (Oxygen Delivery Method): room air        LABS:    CBC Full  -  ( 18 Feb 2024 06:10 )  WBC Count : 6.65 K/uL  RBC Count : 3.53 M/uL  Hemoglobin : 10.5 g/dL  Hematocrit : 31.0 %  Platelet Count - Automated : 261 K/uL  Mean Cell Volume : 87.8 fl  Mean Cell Hemoglobin : 29.7 pg  Mean Cell Hemoglobin Concentration : 33.9 gm/dL  Auto Neutrophil # : x  Auto Lymphocyte # : x  Auto Monocyte # : x  Auto Eosinophil # : x  Auto Basophil # : x  Auto Neutrophil % : x  Auto Lymphocyte % : x  Auto Monocyte % : x  Auto Eosinophil % : x  Auto Basophil % : x      02-18    135  |  106  |  14  ----------------------------<  114<H>  3.7   |  24  |  0.74    Ca    9.4      18 Feb 2024 06:10      CAPILLARY BLOOD GLUCOSE              Creatinine Trend: 0.74<--, 0.70<--, 0.63<--, 0.89<--, 1.58<--  I&O's Summary    17 Feb 2024 07:01  -  18 Feb 2024 07:00  --------------------------------------------------------  IN: 0 mL / OUT: 500 mL / NET: -500 mL            Clean Catch Clean Catch (Midstream)  02-14 @ 17:43   >100,000 CFU/ml Escherichia coli  --  Escherichia coli      .Blood Blood-Peripheral  02-14 @ 16:45   No growth at 72 Hours  --  --      .Blood Blood-Peripheral  02-14 @ 16:30   No growth at 72 Hours  --  --          MEDICATIONS:    MEDICATIONS  (STANDING):  amLODIPine   Tablet 10 milliGRAM(s) Oral daily  aspirin  chewable 81 milliGRAM(s) Oral daily  atorvastatin 20 milliGRAM(s) Oral at bedtime  bacitracin   Ointment 1 Application(s) Topical two times a day  baclofen 10 milliGRAM(s) Oral every 12 hours  cholecalciferol 1000 Unit(s) Oral daily  clopidogrel Tablet 75 milliGRAM(s) Oral daily  enoxaparin Injectable 30 milliGRAM(s) SubCutaneous every 24 hours  fluticasone propionate 50 MICROgram(s)/spray Nasal Spray 1 Spray(s) Both Nostrils two times a day  folic acid 1 milliGRAM(s) Oral daily  lactated ringers. 1000 milliLiter(s) (100 mL/Hr) IV Continuous <Continuous>  latanoprost 0.005% Ophthalmic Solution 1 Drop(s) Both EYES at bedtime  multivitamin 1 Tablet(s) Oral daily  polyethylene glycol 3350 17 Gram(s) Oral daily  senna 2 Tablet(s) Oral at bedtime      MEDICATIONS  (PRN):  acetaminophen     Tablet .. 650 milliGRAM(s) Oral every 6 hours PRN Temp greater or equal to 38C (100.4F), Mild Pain (1 - 3)  ondansetron Injectable 4 milliGRAM(s) IV Push every 8 hours PRN Nausea and/or Vomiting      REVIEW OF SYSTEMS:                           ALL ROS DONE [ X   ]    CONSTITUTIONAL:  LETHARGIC [   ], FEVER [   ], UNRESPONSIVE [   ]  CVS:  CP  [   ], SOB, [   ], PALPITATIONS [   ], DIZZYNESS [   ]  RS: COUGH [   ], SPUTUM [   ]  GI: ABDOMINAL PAIN [   ], NAUSEA [   ], VOMITINGS [   ], DIARRHEA [   ], CONSTIPATION [   ]  :  DYSURIA [   ], NOCTURIA [   ], INCREASED FREQUENCY [   ], DRIBLING [   ],  SKELETAL: PAINFUL JOINTS [   ], SWOLLEN JOINTS [   ], NECK ACHE [   ], LOW BACK ACHE [   ],  SKIN : ULCERS [   ], RASH [   ], ITCHING [   ]  CNS: HEAD ACHE [   ], DOUBLE VISION [   ], BLURRED VISION [   ], AMS / CONFUSION [   ], SEIZURES [   ], WEAKNESS [   ],TINGLING / NUMBNESS [   ]      PHYSICAL EXAMINATION:  GENERAL APPEARANCE: NO DISTRESS  HEENT:  NO PALLOR, NO  JVD,  NO   NODES, NECK SUPPLE  CVS: S1 +, S2 +,   RS: AEEB,  OCCASIONAL  RALES +,   NO RONCHI  ABD: SOFT, NT, NO, BS +  EXT: NO PE  SKIN: WARM,   SKELETAL:  ROM ACCEPTABLE [ WHEN EXAMINER MOVES ARMS/LEGS, BUT IS NOT CONSISTENTLY PARTICIPATING IN EXAM - ?DUE TO DEMENTIA]  CNS:  AAO X 1    RADIOLOGY :    RADIOLOGY AND READINGS REVIEWED    ASSESSMENT :     Altered mental status    Hypertension    Hyperlipidemia    Dementia    Pulmonary embolism        PLAN:  HPI:  Patient is a 84 y/o F who is legally blind and totally dependent for ADLS with PMH of HTN, HLD, PE, PAD, CVA with residual dysphagia?. dementia (AAOx1-2 at baseline) who was sent by NH for altered mental status. Patient is usually interactive and talking but this morning patient was found to be lethargic and minimally interactive. Due to this patient was sent to the hospital. Patient was seen at bedside but patient is minimally responsive and only making grunting sounds to painful stimuli. No ROS could be obtained from the patient due to altered mental status.    (14 Feb 2024 21:33)      # SEPSIS S/T UTI  - PLACED ON ROCEPHIN, F/U BCX [NGTD] AND UCX [ECOLI]  - ID CONSULT    # ACUTE ENCEPHALOPATHY ON CHRONIC DEMENTIA  - NOTED CT HEAD  - RECOMMENDED FOR REPEAT CTA HEAD AND NECK VS. MRA HEAD/NECK + MR BRAIN PER NEUROLOGY  - F/U PT EVAL  - F/U ST EVAL  - ASA + PLAVIX  - NOTED UTOX, ABG  - NEUROLOGY CONSULT    # CONSTIPATION, FECAL IMPACTION  - BOWEL REGIMEN  - PLANNED FOR ENEMA    # NOAH  - IMPROVED S/P IVF    # AMBULATORY DYSFUNCTION S/T OA, OP, HX OF ?CVA  - F/U PT EVAL    # VITAMIN D DEFICIENCY  - REPLETE WITH SUPPLEMENT    # ? HX OF CVA  # ? HX OF PE IN RLL IN 2022 - ? UNTREATED DUE TO RECURRENT FALLS     # HTN  # HLD  # LEGALLY BLIND, GLAUCOMA  # GI AND DVT PPX     Patient is a 83y old  Female who presents with a chief complaint of Altered mental status     (17 Feb 2024 18:32)    PATIENT IS SEEN AND EXAMINED IN MEDICAL FLOOR.      ALLERGIES:  No Known Allergies      Daily Height in cm: 167.6 (17 Feb 2024 18:32)    Daily     VITALS:    Vital Signs Last 24 Hrs  T(C): 36.5 (18 Feb 2024 05:40), Max: 36.9 (17 Feb 2024 13:03)  T(F): 97.7 (18 Feb 2024 05:40), Max: 98.4 (17 Feb 2024 13:03)  HR: 88 (18 Feb 2024 05:40) (80 - 99)  BP: 139/78 (18 Feb 2024 05:40) (135/66 - 145/68)  BP(mean): --  RR: 17 (18 Feb 2024 05:40) (17 - 17)  SpO2: 97% (18 Feb 2024 05:40) (94% - 98%)    Parameters below as of 18 Feb 2024 05:40  Patient On (Oxygen Delivery Method): room air        LABS:    CBC Full  -  ( 18 Feb 2024 06:10 )  WBC Count : 6.65 K/uL  RBC Count : 3.53 M/uL  Hemoglobin : 10.5 g/dL  Hematocrit : 31.0 %  Platelet Count - Automated : 261 K/uL  Mean Cell Volume : 87.8 fl  Mean Cell Hemoglobin : 29.7 pg  Mean Cell Hemoglobin Concentration : 33.9 gm/dL  Auto Neutrophil # : x  Auto Lymphocyte # : x  Auto Monocyte # : x  Auto Eosinophil # : x  Auto Basophil # : x  Auto Neutrophil % : x  Auto Lymphocyte % : x  Auto Monocyte % : x  Auto Eosinophil % : x  Auto Basophil % : x      02-18    135  |  106  |  14  ----------------------------<  114<H>  3.7   |  24  |  0.74    Ca    9.4      18 Feb 2024 06:10      CAPILLARY BLOOD GLUCOSE              Creatinine Trend: 0.74<--, 0.70<--, 0.63<--, 0.89<--, 1.58<--  I&O's Summary    17 Feb 2024 07:01  -  18 Feb 2024 07:00  --------------------------------------------------------  IN: 0 mL / OUT: 500 mL / NET: -500 mL            Clean Catch Clean Catch (Midstream)  02-14 @ 17:43   >100,000 CFU/ml Escherichia coli  --  Escherichia coli      .Blood Blood-Peripheral  02-14 @ 16:45   No growth at 72 Hours  --  --      .Blood Blood-Peripheral  02-14 @ 16:30   No growth at 72 Hours  --  --          MEDICATIONS:    MEDICATIONS  (STANDING):  amLODIPine   Tablet 10 milliGRAM(s) Oral daily  aspirin  chewable 81 milliGRAM(s) Oral daily  atorvastatin 20 milliGRAM(s) Oral at bedtime  bacitracin   Ointment 1 Application(s) Topical two times a day  baclofen 10 milliGRAM(s) Oral every 12 hours  cholecalciferol 1000 Unit(s) Oral daily  clopidogrel Tablet 75 milliGRAM(s) Oral daily  enoxaparin Injectable 30 milliGRAM(s) SubCutaneous every 24 hours  fluticasone propionate 50 MICROgram(s)/spray Nasal Spray 1 Spray(s) Both Nostrils two times a day  folic acid 1 milliGRAM(s) Oral daily  lactated ringers. 1000 milliLiter(s) (100 mL/Hr) IV Continuous <Continuous>  latanoprost 0.005% Ophthalmic Solution 1 Drop(s) Both EYES at bedtime  multivitamin 1 Tablet(s) Oral daily  polyethylene glycol 3350 17 Gram(s) Oral daily  senna 2 Tablet(s) Oral at bedtime      MEDICATIONS  (PRN):  acetaminophen     Tablet .. 650 milliGRAM(s) Oral every 6 hours PRN Temp greater or equal to 38C (100.4F), Mild Pain (1 - 3)  ondansetron Injectable 4 milliGRAM(s) IV Push every 8 hours PRN Nausea and/or Vomiting      REVIEW OF SYSTEMS:                           ALL ROS DONE [ X   ]    CONSTITUTIONAL:  LETHARGIC [   ], FEVER [   ], UNRESPONSIVE [   ]  CVS:  CP  [   ], SOB, [   ], PALPITATIONS [   ], DIZZYNESS [   ]  RS: COUGH [   ], SPUTUM [   ]  GI: ABDOMINAL PAIN [   ], NAUSEA [   ], VOMITINGS [   ], DIARRHEA [   ], CONSTIPATION [   ]  :  DYSURIA [   ], NOCTURIA [   ], INCREASED FREQUENCY [   ], DRIBLING [   ],  SKELETAL: PAINFUL JOINTS [   ], SWOLLEN JOINTS [   ], NECK ACHE [   ], LOW BACK ACHE [   ],  SKIN : ULCERS [   ], RASH [   ], ITCHING [   ]  CNS: HEAD ACHE [   ], DOUBLE VISION [   ], BLURRED VISION [   ], AMS / CONFUSION [   ], SEIZURES [   ], WEAKNESS [   ],TINGLING / NUMBNESS [   ]      PHYSICAL EXAMINATION:  GENERAL APPEARANCE: NO DISTRESS  HEENT:  NO PALLOR, NO  JVD,  NO   NODES, NECK SUPPLE  CVS: S1 +, S2 +,   RS: AEEB,  OCCASIONAL  RALES +,   NO RONCHI  ABD: SOFT, NT, NO, BS +  EXT: NO PE  SKIN: WARM,   SKELETAL:  ROM ACCEPTABLE [ WHEN EXAMINER MOVES ARMS/LEGS, BUT IS NOT CONSISTENTLY PARTICIPATING IN EXAM - ?DUE TO DEMENTIA]  CNS:  AAO X 1    RADIOLOGY :    RADIOLOGY AND READINGS REVIEWED    ASSESSMENT :     Altered mental status    Hypertension    Hyperlipidemia    Dementia    Pulmonary embolism        PLAN:  HPI:  Patient is a 84 y/o F who is legally blind and totally dependent for ADLS with PMH of HTN, HLD, PE, PAD, CVA with residual dysphagia?. dementia (AAOx1-2 at baseline) who was sent by NH for altered mental status. Patient is usually interactive and talking but this morning patient was found to be lethargic and minimally interactive. Due to this patient was sent to the hospital. Patient was seen at bedside but patient is minimally responsive and only making grunting sounds to painful stimuli. No ROS could be obtained from the patient due to altered mental status.    (14 Feb 2024 21:33)    # [2/18] CASE DISCUSSED AT LENGTH WITH PATIENT'S  JAREK AND FRIEND AT BEDSIDE. ALL QUESTIONS ANSWERED. DISCUSSED REGARDING PATIENT'S ADVANCED DEMENTIA, DECLINING MOBILITY, DECLINING NUTRITIONAL STATUS. DISCUSSED THAT PATIENT'S PROGNOSIS IS POOR.  FAMILY VERBALIZED UNDERSTANDING. REGARDING GOALS OF CARE FAMILY WOULD LIKE TO DISCUSS AMONGST THEMSELVES. PALLIATIVE CARE CONSULT.      # SEPSIS S/T UTI  - PLACED ON ROCEPHIN, F/U BCX [NGTD] AND UCX [ECOLI]  - ID CONSULT    # ACUTE ENCEPHALOPATHY ON CHRONIC DEMENTIA  - NOTED CT HEAD  - RECOMMENDED FOR REPEAT CTA HEAD AND NECK VS. MRA HEAD/NECK + MR BRAIN PER NEUROLOGY  - F/U PT EVAL  - F/U ST EVAL  - ASA + PLAVIX  - NOTED UTOX, ABG  - NEUROLOGY CONSULT    # CONSTIPATION, FECAL IMPACTION  - BOWEL REGIMEN  - PLANNED FOR ENEMA    # NOAH  - IMPROVED S/P IVF    # AMBULATORY DYSFUNCTION S/T OA, OP, HX OF ?CVA  - F/U PT EVAL    # VITAMIN D DEFICIENCY  - REPLETE WITH SUPPLEMENT    # ? HX OF CVA  # ? HX OF PE IN RLL IN 2022 - ? UNTREATED DUE TO RECURRENT FALLS     # HTN  # HLD  # LEGALLY BLIND, GLAUCOMA  # GI AND DVT PPX

## 2024-02-19 LAB
ANION GAP SERPL CALC-SCNC: 6 MMOL/L — SIGNIFICANT CHANGE UP (ref 5–17)
BUN SERPL-MCNC: 15 MG/DL — SIGNIFICANT CHANGE UP (ref 7–18)
CALCIUM SERPL-MCNC: 9.5 MG/DL — SIGNIFICANT CHANGE UP (ref 8.4–10.5)
CHLORIDE SERPL-SCNC: 105 MMOL/L — SIGNIFICANT CHANGE UP (ref 96–108)
CO2 SERPL-SCNC: 26 MMOL/L — SIGNIFICANT CHANGE UP (ref 22–31)
CREAT SERPL-MCNC: 0.77 MG/DL — SIGNIFICANT CHANGE UP (ref 0.5–1.3)
CULTURE RESULTS: SIGNIFICANT CHANGE UP
CULTURE RESULTS: SIGNIFICANT CHANGE UP
EGFR: 76 ML/MIN/1.73M2 — SIGNIFICANT CHANGE UP
GLUCOSE SERPL-MCNC: 113 MG/DL — HIGH (ref 70–99)
HCOV PNL SPEC NAA+PROBE: DETECTED
HCT VFR BLD CALC: 34.8 % — SIGNIFICANT CHANGE UP (ref 34.5–45)
HGB BLD-MCNC: 11.3 G/DL — LOW (ref 11.5–15.5)
LACTATE SERPL-SCNC: 1.1 MMOL/L — SIGNIFICANT CHANGE UP (ref 0.7–2)
MCHC RBC-ENTMCNC: 29.3 PG — SIGNIFICANT CHANGE UP (ref 27–34)
MCHC RBC-ENTMCNC: 32.5 GM/DL — SIGNIFICANT CHANGE UP (ref 32–36)
MCV RBC AUTO: 90.2 FL — SIGNIFICANT CHANGE UP (ref 80–100)
NRBC # BLD: 0 /100 WBCS — SIGNIFICANT CHANGE UP (ref 0–0)
PLATELET # BLD AUTO: 289 K/UL — SIGNIFICANT CHANGE UP (ref 150–400)
POTASSIUM SERPL-MCNC: 3.7 MMOL/L — SIGNIFICANT CHANGE UP (ref 3.5–5.3)
POTASSIUM SERPL-SCNC: 3.7 MMOL/L — SIGNIFICANT CHANGE UP (ref 3.5–5.3)
RAPID RVP RESULT: DETECTED
RBC # BLD: 3.86 M/UL — SIGNIFICANT CHANGE UP (ref 3.8–5.2)
RBC # FLD: 13.3 % — SIGNIFICANT CHANGE UP (ref 10.3–14.5)
SARS-COV-2 RNA SPEC QL NAA+PROBE: SIGNIFICANT CHANGE UP
SODIUM SERPL-SCNC: 137 MMOL/L — SIGNIFICANT CHANGE UP (ref 135–145)
SPECIMEN SOURCE: SIGNIFICANT CHANGE UP
SPECIMEN SOURCE: SIGNIFICANT CHANGE UP
WBC # BLD: 5.3 K/UL — SIGNIFICANT CHANGE UP (ref 3.8–10.5)
WBC # FLD AUTO: 5.3 K/UL — SIGNIFICANT CHANGE UP (ref 3.8–10.5)

## 2024-02-19 RX ORDER — SODIUM CHLORIDE 9 MG/ML
1000 INJECTION, SOLUTION INTRAVENOUS
Refills: 0 | Status: DISCONTINUED | OUTPATIENT
Start: 2024-02-19 | End: 2024-02-23

## 2024-02-19 RX ORDER — CEFTRIAXONE 500 MG/1
1000 INJECTION, POWDER, FOR SOLUTION INTRAMUSCULAR; INTRAVENOUS EVERY 24 HOURS
Refills: 0 | Status: COMPLETED | OUTPATIENT
Start: 2024-02-19 | End: 2024-02-21

## 2024-02-19 RX ORDER — SODIUM CHLORIDE 9 MG/ML
1000 INJECTION, SOLUTION INTRAVENOUS
Refills: 0 | Status: DISCONTINUED | OUTPATIENT
Start: 2024-02-19 | End: 2024-02-19

## 2024-02-19 RX ADMIN — Medication 1 MILLIGRAM(S): at 12:39

## 2024-02-19 RX ADMIN — Medication 1 SPRAY(S): at 05:58

## 2024-02-19 RX ADMIN — CLOPIDOGREL BISULFATE 75 MILLIGRAM(S): 75 TABLET, FILM COATED ORAL at 12:39

## 2024-02-19 RX ADMIN — Medication 1 APPLICATION(S): at 06:04

## 2024-02-19 RX ADMIN — AMLODIPINE BESYLATE 10 MILLIGRAM(S): 2.5 TABLET ORAL at 05:59

## 2024-02-19 RX ADMIN — ATORVASTATIN CALCIUM 20 MILLIGRAM(S): 80 TABLET, FILM COATED ORAL at 23:06

## 2024-02-19 RX ADMIN — Medication 81 MILLIGRAM(S): at 12:39

## 2024-02-19 RX ADMIN — Medication 1 TABLET(S): at 12:39

## 2024-02-19 RX ADMIN — POLYETHYLENE GLYCOL 3350 17 GRAM(S): 17 POWDER, FOR SOLUTION ORAL at 12:39

## 2024-02-19 RX ADMIN — Medication 100 MILLIGRAM(S): at 12:39

## 2024-02-19 RX ADMIN — CEFTRIAXONE 100 MILLIGRAM(S): 500 INJECTION, POWDER, FOR SOLUTION INTRAMUSCULAR; INTRAVENOUS at 18:29

## 2024-02-19 RX ADMIN — ENOXAPARIN SODIUM 30 MILLIGRAM(S): 100 INJECTION SUBCUTANEOUS at 05:58

## 2024-02-19 RX ADMIN — Medication 1000 UNIT(S): at 12:39

## 2024-02-19 RX ADMIN — Medication 1 SPRAY(S): at 18:29

## 2024-02-19 RX ADMIN — SODIUM CHLORIDE 75 MILLILITER(S): 9 INJECTION, SOLUTION INTRAVENOUS at 23:09

## 2024-02-19 RX ADMIN — LATANOPROST 1 DROP(S): 0.05 SOLUTION/ DROPS OPHTHALMIC; TOPICAL at 23:06

## 2024-02-19 RX ADMIN — Medication 100 MILLIGRAM(S): at 23:08

## 2024-02-19 RX ADMIN — Medication 100 MILLIGRAM(S): at 18:30

## 2024-02-19 RX ADMIN — SENNA PLUS 2 TABLET(S): 8.6 TABLET ORAL at 23:06

## 2024-02-19 RX ADMIN — Medication 1 APPLICATION(S): at 18:30

## 2024-02-19 NOTE — DISCHARGE NOTE PROVIDER - NSDCCPCAREPLAN_GEN_ALL_CORE_FT
PRINCIPAL DISCHARGE DIAGNOSIS  Diagnosis: Acute metabolic encephalopathy  Assessment and Plan of Treatment: You presented to the hospital with altered mental status and you were fund to have UTI, and you were treated with antibiotics. There was conern for stroke but imaging of your brain did not show signs of stroke.      SECONDARY DISCHARGE DIAGNOSES  Diagnosis: Hypertension  Assessment and Plan of Treatment: Your blood pressure has tam controlled and you should continue to take your home medications.  Low salt diet  Activity as tolerated.  Take all medication as prescribed.  Follow up with your medical doctor for routine blood pressure monitoring at your next visit.  Notify your doctor if you have any of the following symptoms:   Dizziness, Lightheadedness, Blurry vision, Headache, Chest pain, Shortness of breath      Diagnosis: Acute UTI  Assessment and Plan of Treatment: HOME CARE INSTRUCTIONS  If you were prescribed antibiotics, take them exactly as your caregiver instructs you. Finish the medication even if you feel better after you have only taken some of the medication.  Drink enough water and fluids to keep your urine clear or pale yellow.  Avoid caffeine, tea, and carbonated beverages. They tend to irritate your bladder.  Empty your bladder often. Avoid holding urine for long periods of time.  After a bowel movement, women should cleanse from front to back. Use each tissue only once.  SEEK MEDICAL CARE IF:  You have back pain.  You develop a fever.  Your symptoms do not begin to resolve within 3 days.  SEEK IMMEDIATE MEDICAL CARE IF:  You have severe back pain or lower abdominal pain.  You develop chills.  You have nausea or vomiting.  You have continued burning or discomfort with urination.      Diagnosis: Coronavirus infection  Assessment and Plan of Treatment: You were detected to have a viral infection. This treated with supoprtive measures.     PRINCIPAL DISCHARGE DIAGNOSIS  Diagnosis: Acute metabolic encephalopathy  Assessment and Plan of Treatment: You presented to the hospital with altered mental status and you were fund to have UTI, and you were treated with antibiotics. There was conern for stroke but imaging of your brain did not show signs of stroke.  COmfort measures  Supplemental oxygen as needed      SECONDARY DISCHARGE DIAGNOSES  Diagnosis: Advanced dementia  Assessment and Plan of Treatment: Comfort measures   supportive care    Diagnosis: Acute UTI  Assessment and Plan of Treatment: You were treated with antibiotic and completed the course    Diagnosis: Coronavirus infection  Assessment and Plan of Treatment: You were detected to have a viral infection. This treated with supoprtive measures.    Diagnosis: Hypertension  Assessment and Plan of Treatment: Your blood pressure has tam controlled  continue to take your home medications.       PRINCIPAL DISCHARGE DIAGNOSIS  Diagnosis: Acute metabolic encephalopathy  Assessment and Plan of Treatment: You presented to the hospital with altered mental status and you were fund to have UTI, and you were treated with antibiotics. There was conern for stroke but imaging of your brain did not show signs of stroke.  COmfort measures  Supplemental oxygen as needed      SECONDARY DISCHARGE DIAGNOSES  Diagnosis: Acute UTI  Assessment and Plan of Treatment: You were treated with antibiotic and completed the course    Diagnosis: Coronavirus infection  Assessment and Plan of Treatment: You were detected to have a viral infection. This treated with supoprtive measures.    Diagnosis: Hypertension  Assessment and Plan of Treatment: Your blood pressure has tam controlled  continue to take your home medications.      Diagnosis: Advanced dementia  Assessment and Plan of Treatment: Comfort measures   supportive care     PRINCIPAL DISCHARGE DIAGNOSIS  Diagnosis: Acute metabolic encephalopathy  Assessment and Plan of Treatment: You presented to the hospital with altered mental status and you were fund to have UTI, and you were treated with antibiotics. There was concern for stroke but imaging of your brain did not show signs of stroke.  Comfort measures  Supplemental oxygen as needed      SECONDARY DISCHARGE DIAGNOSES  Diagnosis: Acute UTI  Assessment and Plan of Treatment: A urine culture grew e. coli bacteria and you were treated with antibiotic Ceftriaxone and completed the course    Diagnosis: Coronavirus infection  Assessment and Plan of Treatment: You were detected to have a viral infection. This treated with supoprtive measures.    Diagnosis: Hypertension  Assessment and Plan of Treatment: Your blood pressure has tam controlled  continue to take your home medications.      Diagnosis: Advanced dementia  Assessment and Plan of Treatment: Comfort measures   supportive care

## 2024-02-19 NOTE — PROGRESS NOTE ADULT - SUBJECTIVE AND OBJECTIVE BOX
84y Female    Meds:  cefTRIAXone   IVPB 1000 milliGRAM(s) IV Intermittent every 24 hours    Allergies    No Known Allergies    Intolerances        VITALS:  Vital Signs Last 24 Hrs  T(C): 36.8 (19 Feb 2024 12:21), Max: 38.4 (18 Feb 2024 20:29)  T(F): 98.2 (19 Feb 2024 12:21), Max: 101.2 (18 Feb 2024 20:29)  HR: 101 (19 Feb 2024 12:39) (90 - 103)  BP: 142/81 (19 Feb 2024 12:39) (142/81 - 172/64)  BP(mean): 97 (18 Feb 2024 20:29) (97 - 97)  RR: 17 (19 Feb 2024 12:21) (17 - 18)  SpO2: 95% (19 Feb 2024 12:39) (94% - 97%)    Parameters below as of 19 Feb 2024 12:39  Patient On (Oxygen Delivery Method): room air        LABS/DIAGNOSTIC TESTS:                          11.3   5.30  )-----------( 289      ( 19 Feb 2024 06:18 )             34.8     Lactate, Blood: 1.1 mmol/L (02-19 @ 07:05)      02-19    137  |  105  |  15  ----------------------------<  113<H>  3.7   |  26  |  0.77    Ca    9.5      19 Feb 2024 06:18            CULTURES: Clean Catch Clean Catch (Midstream)  02-14 @ 17:43   >100,000 CFU/ml Escherichia coli  --  Escherichia coli      .Blood Blood-Peripheral  02-14 @ 16:45   No growth at 4 days  --  --      .Blood Blood-Peripheral  02-14 @ 16:30   No growth at 4 days  --  --            RADIOLOGY:      ROS:  [  ] UNABLE TO ELICIT 84y Female who is waking up and mumbling a few words but not making any sense and then drifts off to sleep. She spiked a temp to 101.2 last night, she has no diarrhea , she is not coughing in front of me and does not appear SOB.     Meds:  cefTRIAXone   IVPB 1000 milliGRAM(s) IV Intermittent every 24 hours    Allergies    No Known Allergies    Intolerances        VITALS:  Vital Signs Last 24 Hrs  T(C): 36.8 (19 Feb 2024 12:21), Max: 38.4 (18 Feb 2024 20:29)  T(F): 98.2 (19 Feb 2024 12:21), Max: 101.2 (18 Feb 2024 20:29)  HR: 101 (19 Feb 2024 12:39) (90 - 103)  BP: 142/81 (19 Feb 2024 12:39) (142/81 - 172/64)  BP(mean): 97 (18 Feb 2024 20:29) (97 - 97)  RR: 17 (19 Feb 2024 12:21) (17 - 18)  SpO2: 95% (19 Feb 2024 12:39) (94% - 97%)    Parameters below as of 19 Feb 2024 12:39  Patient On (Oxygen Delivery Method): room air        LABS/DIAGNOSTIC TESTS:                          11.3   5.30  )-----------( 289      ( 19 Feb 2024 06:18 )             34.8     Lactate, Blood: 1.1 mmol/L (02-19 @ 07:05)      02-19    137  |  105  |  15  ----------------------------<  113<H>  3.7   |  26  |  0.77    Ca    9.5      19 Feb 2024 06:18            CULTURES: Clean Catch Clean Catch (Midstream)  02-14 @ 17:43   >100,000 CFU/ml Escherichia coli  --  Escherichia coli      .Blood Blood-Peripheral  02-14 @ 16:45   No growth at 4 days  --  --      .Blood Blood-Peripheral  02-14 @ 16:30   No growth at 4 days  --  --            RADIOLOGY:      ROS:  [ x ] UNABLE TO ELICIT

## 2024-02-19 NOTE — PHYSICAL THERAPY INITIAL EVALUATION ADULT - ADDITIONAL COMMENTS
Patient received from [facility]. Patient unable to provide history verbally (grunts and stares at therapist). History taken from NH notes, Chart and H&P. As per H&P, patient fully dependent in ADL's, bed bound or limited ambulatory.

## 2024-02-19 NOTE — PHYSICAL THERAPY INITIAL EVALUATION ADULT - ACTIVE RANGE OF MOTION EXAMINATION, REHAB EVAL
flexion contracture on both knees, Both shoulder at 90degrees with pain during passive ROM to 100degrees; * Note: Patient was slapping therapist hands when PT doing passive ROM on BLE/deficits as listed below

## 2024-02-19 NOTE — DISCHARGE NOTE PROVIDER - CARE PROVIDER_API CALL
Rizwan Andrade Sullivan  Internal Medicine  [address] [facility], [address] [place] [id number]  Phone: [phone]  Fax: [phone]  Follow Up Time:

## 2024-02-19 NOTE — PHYSICAL THERAPY INITIAL EVALUATION ADULT - LEVEL OF INDEPENDENCE: GAIT, REHAB EVAL
Patient refused to perform the activity; patient swatting therapist hands away from her body/unable to perform

## 2024-02-19 NOTE — DISCHARGE NOTE PROVIDER - NSDCMRMEDTOKEN_GEN_ALL_CORE_FT
acetaminophen 325 mg oral tablet: 2 tab(s) orally 3 times a day  amLODIPine 10 mg oral tablet: 1 tab(s) orally once a day  atorvastatin 20 mg oral tablet: 1 tab(s) orally once a day (at bedtime)  bacitracin 500 units/g topical ointment: Apply topically to affected area 2 times a day Apply to posterior head and right hand skin tear.  bimatoprost 0.01% ophthalmic solution: 1 drop(s) in each affected eye once a day  brimonidine-timolol 0.2%-0.5% ophthalmic solution: 1 drop(s) in each affected eye 2 times a day  cholecalciferol 25 mcg (1000 intl units) oral tablet: 1 tab(s) orally once a day  folic acid 1 mg oral tablet: 1 tab(s) orally once a day  MiraLax oral powder for reconstitution: 17 gram(s) orally once a day  Multiple Vitamins oral capsule: 1 cap(s) orally once a day  senna (sennosides) 8.6 mg oral tablet: 2 tab(s) orally once a day (at bedtime)   acetaminophen 325 mg oral tablet: 2 tab(s) orally 3 times a day  amLODIPine 10 mg oral tablet: 1 tab(s) orally once a day  aspirin 81 mg oral tablet, chewable: 1 tab(s) orally once a day  atorvastatin 20 mg oral tablet: 1 tab(s) orally once a day (at bedtime)  bacitracin 500 units/g topical ointment: Apply topically to affected area 2 times a day Apply to posterior head and right hand skin tear.  clopidogrel 75 mg oral tablet: 1 tab(s) orally once a day  MiraLax oral powder for reconstitution: 17 gram(s) orally once a day  senna (sennosides) 8.6 mg oral tablet: 2 tab(s) orally once a day (at bedtime)

## 2024-02-19 NOTE — PROGRESS NOTE ADULT - SUBJECTIVE AND OBJECTIVE BOX
Patient is a 84y old  Female who presents with a chief complaint of AMS. (18 Feb 2024 16:36)    PATIENT IS SEEN AND EXAMINED IN MEDICAL FLOOR.  POOJA [    ]    SHIRLEY [   ]      GT [   ]    ALLERGIES:  No Known Allergies      Daily     Daily     VITALS:    Vital Signs Last 24 Hrs  T(C): 36.7 (19 Feb 2024 05:27), Max: 38.4 (18 Feb 2024 20:29)  T(F): 98.1 (19 Feb 2024 05:27), Max: 101.2 (18 Feb 2024 20:29)  HR: 90 (19 Feb 2024 05:27) (90 - 90)  BP: 149/69 (19 Feb 2024 05:27) (130/74 - 172/64)  BP(mean): 97 (18 Feb 2024 20:29) (97 - 97)  RR: 18 (19 Feb 2024 05:27) (17 - 18)  SpO2: 95% (19 Feb 2024 05:27) (94% - 97%)    Parameters below as of 19 Feb 2024 05:27  Patient On (Oxygen Delivery Method): room air        LABS:    CBC Full  -  ( 19 Feb 2024 06:18 )  WBC Count : 5.30 K/uL  RBC Count : 3.86 M/uL  Hemoglobin : 11.3 g/dL  Hematocrit : 34.8 %  Platelet Count - Automated : 289 K/uL  Mean Cell Volume : 90.2 fl  Mean Cell Hemoglobin : 29.3 pg  Mean Cell Hemoglobin Concentration : 32.5 gm/dL  Auto Neutrophil # : x  Auto Lymphocyte # : x  Auto Monocyte # : x  Auto Eosinophil # : x  Auto Basophil # : x  Auto Neutrophil % : x  Auto Lymphocyte % : x  Auto Monocyte % : x  Auto Eosinophil % : x  Auto Basophil % : x      02-19    137  |  105  |  15  ----------------------------<  113<H>  3.7   |  26  |  0.77    Ca    9.5      19 Feb 2024 06:18      CAPILLARY BLOOD GLUCOSE              Creatinine Trend: 0.77<--, 0.74<--, 0.70<--, 0.63<--, 0.89<--, 1.58<--  I&O's Summary    18 Feb 2024 07:01  -  19 Feb 2024 07:00  --------------------------------------------------------  IN: 0 mL / OUT: 450 mL / NET: -450 mL            Clean Catch Clean Catch (Midstream)  02-14 @ 17:43   >100,000 CFU/ml Escherichia coli  --  Escherichia coli      .Blood Blood-Peripheral  02-14 @ 16:45   No growth at 4 days  --  --      .Blood Blood-Peripheral  02-14 @ 16:30   No growth at 4 days  --  --          MEDICATIONS:    MEDICATIONS  (STANDING):  amLODIPine   Tablet 10 milliGRAM(s) Oral daily  aspirin  chewable 81 milliGRAM(s) Oral daily  atorvastatin 20 milliGRAM(s) Oral at bedtime  bacitracin   Ointment 1 Application(s) Topical two times a day  cholecalciferol 1000 Unit(s) Oral daily  clopidogrel Tablet 75 milliGRAM(s) Oral daily  enoxaparin Injectable 30 milliGRAM(s) SubCutaneous every 24 hours  fluticasone propionate 50 MICROgram(s)/spray Nasal Spray 1 Spray(s) Both Nostrils two times a day  folic acid 1 milliGRAM(s) Oral daily  lactated ringers. 1000 milliLiter(s) (100 mL/Hr) IV Continuous <Continuous>  latanoprost 0.005% Ophthalmic Solution 1 Drop(s) Both EYES at bedtime  multivitamin 1 Tablet(s) Oral daily  polyethylene glycol 3350 17 Gram(s) Oral daily  senna 2 Tablet(s) Oral at bedtime      MEDICATIONS  (PRN):  acetaminophen     Tablet .. 650 milliGRAM(s) Oral every 6 hours PRN Temp greater or equal to 38C (100.4F), Mild Pain (1 - 3)  ondansetron Injectable 4 milliGRAM(s) IV Push every 8 hours PRN Nausea and/or Vomiting      REVIEW OF SYSTEMS:                           ALL ROS DONE [ X   ]    CONSTITUTIONAL:  LETHARGIC [   ], FEVER [   ], UNRESPONSIVE [   ]  CVS:  CP  [   ], SOB, [   ], PALPITATIONS [   ], DIZZYNESS [   ]  RS: COUGH [   ], SPUTUM [   ]  GI: ABDOMINAL PAIN [   ], NAUSEA [   ], VOMITINGS [   ], DIARRHEA [   ], CONSTIPATION [   ]  :  DYSURIA [   ], NOCTURIA [   ], INCREASED FREQUENCY [   ], DRIBLING [   ],  SKELETAL: PAINFUL JOINTS [   ], SWOLLEN JOINTS [   ], NECK ACHE [   ], LOW BACK ACHE [   ],  SKIN : ULCERS [   ], RASH [   ], ITCHING [   ]  CNS: HEAD ACHE [   ], DOUBLE VISION [   ], BLURRED VISION [   ], AMS / CONFUSION [   ], SEIZURES [   ], WEAKNESS [   ],TINGLING / NUMBNESS [   ]      PHYSICAL EXAMINATION:  GENERAL APPEARANCE: NO DISTRESS  HEENT:  NO PALLOR, NO  JVD,  NO   NODES, NECK SUPPLE  CVS: S1 +, S2 +,   RS: AEEB,  OCCASIONAL  RALES +,   NO RONCHI  ABD: SOFT, NT, NO, BS +  EXT: NO PE  SKIN: WARM,   SKELETAL:  ROM ACCEPTABLE [ WHEN EXAMINER MOVES ARMS/LEGS, BUT IS NOT CONSISTENTLY PARTICIPATING IN EXAM - ?DUE TO DEMENTIA]  CNS:  AAO X 1    RADIOLOGY :    RADIOLOGY AND READINGS REVIEWED    ASSESSMENT :     Altered mental status    Hypertension    Hyperlipidemia    Dementia    Pulmonary embolism        PLAN:  HPI:  Patient is a 82 y/o F who is legally blind and totally dependent for ADLS with PMH of HTN, HLD, PE, PAD, CVA with residual dysphagia?. dementia (AAOx1-2 at baseline) who was sent by NH for altered mental status. Patient is usually interactive and talking but this morning patient was found to be lethargic and minimally interactive. Due to this patient was sent to the hospital. Patient was seen at bedside but patient is minimally responsive and only making grunting sounds to painful stimuli. No ROS could be obtained from the patient due to altered mental status.    (14 Feb 2024 21:33)    # [2/18] CASE DISCUSSED AT LENGTH WITH PATIENT'S  JAREK AND FRIEND AT BEDSIDE. ALL QUESTIONS ANSWERED. DISCUSSED REGARDING PATIENT'S ADVANCED DEMENTIA, DECLINING MOBILITY, DECLINING NUTRITIONAL STATUS. DISCUSSED THAT PATIENT'S PROGNOSIS IS POOR.  FAMILY VERBALIZED UNDERSTANDING. REGARDING GOALS OF CARE FAMILY WOULD LIKE TO DISCUSS AMONGST THEMSELVES. PALLIATIVE CARE CONSULT.      # FEVER, ACUTE URTI, CORONAVIRUS  - PRN ANTITUSSIVE  - FLONASE  - PRN DUONEB  - CHEST PT    # SEPSIS S/T UTI  - PLACED ON ROCEPHIN, F/U BCX [NGTD] AND UCX [ECOLI]  - ID CONSULT    # ACUTE ENCEPHALOPATHY ON CHRONIC DEMENTIA  - NOTED CT HEAD  - RECOMMENDED FOR REPEAT CTA HEAD AND NECK VS. MRA HEAD/NECK + MR BRAIN PER NEUROLOGY  - F/U PT EVAL  - F/U ST EVAL  - ASA + PLAVIX  - NOTED UTOX, ABG  - NEUROLOGY CONSULT    # CONSTIPATION, FECAL IMPACTION  - BOWEL REGIMEN  - PLANNED FOR ENEMA    # NOAH  - IMPROVED S/P IVF    # AMBULATORY DYSFUNCTION S/T OA, OP, HX OF ?CVA  - F/U PT EVAL    # VITAMIN D DEFICIENCY  - REPLETE WITH SUPPLEMENT    # ? HX OF CVA  # ? HX OF PE IN RLL IN 2022 - ? UNTREATED DUE TO RECURRENT FALLS     # HTN  # HLD  # LEGALLY BLIND, GLAUCOMA  # GI AND DVT PPX   Patient is a 84y old  Female who presents with a chief complaint of AMS. (18 Feb 2024 16:36)    PATIENT IS SEEN AND EXAMINED IN MEDICAL FLOOR.      ALLERGIES:  No Known Allergies      VITALS:    Vital Signs Last 24 Hrs  T(C): 36.7 (19 Feb 2024 05:27), Max: 38.4 (18 Feb 2024 20:29)  T(F): 98.1 (19 Feb 2024 05:27), Max: 101.2 (18 Feb 2024 20:29)  HR: 90 (19 Feb 2024 05:27) (90 - 90)  BP: 149/69 (19 Feb 2024 05:27) (130/74 - 172/64)  BP(mean): 97 (18 Feb 2024 20:29) (97 - 97)  RR: 18 (19 Feb 2024 05:27) (17 - 18)  SpO2: 95% (19 Feb 2024 05:27) (94% - 97%)    Parameters below as of 19 Feb 2024 05:27  Patient On (Oxygen Delivery Method): room air        LABS:    CBC Full  -  ( 19 Feb 2024 06:18 )  WBC Count : 5.30 K/uL  RBC Count : 3.86 M/uL  Hemoglobin : 11.3 g/dL  Hematocrit : 34.8 %  Platelet Count - Automated : 289 K/uL  Mean Cell Volume : 90.2 fl  Mean Cell Hemoglobin : 29.3 pg  Mean Cell Hemoglobin Concentration : 32.5 gm/dL  Auto Neutrophil # : x  Auto Lymphocyte # : x  Auto Monocyte # : x  Auto Eosinophil # : x  Auto Basophil # : x  Auto Neutrophil % : x  Auto Lymphocyte % : x  Auto Monocyte % : x  Auto Eosinophil % : x  Auto Basophil % : x      02-19    137  |  105  |  15  ----------------------------<  113<H>  3.7   |  26  |  0.77    Ca    9.5      19 Feb 2024 06:18      CAPILLARY BLOOD GLUCOSE              Creatinine Trend: 0.77<--, 0.74<--, 0.70<--, 0.63<--, 0.89<--, 1.58<--  I&O's Summary    18 Feb 2024 07:01  -  19 Feb 2024 07:00  --------------------------------------------------------  IN: 0 mL / OUT: 450 mL / NET: -450 mL            Clean Catch Clean Catch (Midstream)  02-14 @ 17:43   >100,000 CFU/ml Escherichia coli  --  Escherichia coli      .Blood Blood-Peripheral  02-14 @ 16:45   No growth at 4 days  --  --      .Blood Blood-Peripheral  02-14 @ 16:30   No growth at 4 days  --  --          MEDICATIONS:    MEDICATIONS  (STANDING):  amLODIPine   Tablet 10 milliGRAM(s) Oral daily  aspirin  chewable 81 milliGRAM(s) Oral daily  atorvastatin 20 milliGRAM(s) Oral at bedtime  bacitracin   Ointment 1 Application(s) Topical two times a day  cholecalciferol 1000 Unit(s) Oral daily  clopidogrel Tablet 75 milliGRAM(s) Oral daily  enoxaparin Injectable 30 milliGRAM(s) SubCutaneous every 24 hours  fluticasone propionate 50 MICROgram(s)/spray Nasal Spray 1 Spray(s) Both Nostrils two times a day  folic acid 1 milliGRAM(s) Oral daily  lactated ringers. 1000 milliLiter(s) (100 mL/Hr) IV Continuous <Continuous>  latanoprost 0.005% Ophthalmic Solution 1 Drop(s) Both EYES at bedtime  multivitamin 1 Tablet(s) Oral daily  polyethylene glycol 3350 17 Gram(s) Oral daily  senna 2 Tablet(s) Oral at bedtime      MEDICATIONS  (PRN):  acetaminophen     Tablet .. 650 milliGRAM(s) Oral every 6 hours PRN Temp greater or equal to 38C (100.4F), Mild Pain (1 - 3)  ondansetron Injectable 4 milliGRAM(s) IV Push every 8 hours PRN Nausea and/or Vomiting      REVIEW OF SYSTEMS:                           ALL ROS DONE [ X   ]    CONSTITUTIONAL:  LETHARGIC [   ], FEVER [   ], UNRESPONSIVE [   ]  CVS:  CP  [   ], SOB, [   ], PALPITATIONS [   ], DIZZYNESS [   ]  RS: COUGH [   ], SPUTUM [   ]  GI: ABDOMINAL PAIN [   ], NAUSEA [   ], VOMITINGS [   ], DIARRHEA [   ], CONSTIPATION [   ]  :  DYSURIA [   ], NOCTURIA [   ], INCREASED FREQUENCY [   ], DRIBLING [   ],  SKELETAL: PAINFUL JOINTS [   ], SWOLLEN JOINTS [   ], NECK ACHE [   ], LOW BACK ACHE [   ],  SKIN : ULCERS [   ], RASH [   ], ITCHING [   ]  CNS: HEAD ACHE [   ], DOUBLE VISION [   ], BLURRED VISION [   ], AMS / CONFUSION [   ], SEIZURES [   ], WEAKNESS [   ],TINGLING / NUMBNESS [   ]      PHYSICAL EXAMINATION:  GENERAL APPEARANCE: NO DISTRESS  HEENT:  NO PALLOR, NO  JVD,  NO   NODES, NECK SUPPLE  CVS: S1 +, S2 +,   RS: AEEB,  OCCASIONAL  RALES +,   NO RONCHI  ABD: SOFT, NT, NO, BS +  EXT: NO PE  SKIN: WARM,   SKELETAL:  ROM ACCEPTABLE [ WHEN EXAMINER MOVES ARMS/LEGS, BUT IS NOT CONSISTENTLY PARTICIPATING IN EXAM - ?DUE TO DEMENTIA]  CNS:  AAO X 1    RADIOLOGY :    RADIOLOGY AND READINGS REVIEWED    ASSESSMENT :     Altered mental status    Hypertension    Hyperlipidemia    Dementia    Pulmonary embolism        PLAN:  HPI:  Patient is a 82 y/o F who is legally blind and totally dependent for ADLS with PMH of HTN, HLD, PE, PAD, CVA with residual dysphagia?. dementia (AAOx1-2 at baseline) who was sent by NH for altered mental status. Patient is usually interactive and talking but this morning patient was found to be lethargic and minimally interactive. Due to this patient was sent to the hospital. Patient was seen at bedside but patient is minimally responsive and only making grunting sounds to painful stimuli. No ROS could be obtained from the patient due to altered mental status.    (14 Feb 2024 21:33)    # [2/18] CASE DISCUSSED AT LENGTH WITH PATIENT'S  JAREK AND FRIEND AT BEDSIDE. ALL QUESTIONS ANSWERED. DISCUSSED REGARDING PATIENT'S ADVANCED DEMENTIA, DECLINING MOBILITY, DECLINING NUTRITIONAL STATUS. DISCUSSED THAT PATIENT'S PROGNOSIS IS POOR.  FAMILY VERBALIZED UNDERSTANDING. REGARDING GOALS OF CARE FAMILY WOULD LIKE TO DISCUSS AMONGST THEMSELVES. PALLIATIVE CARE CONSULT.      # FEVER, ACUTE URTI, CORONAVIRUS  - PRN ANTITUSSIVE  - FLONASE  - PRN DUONEB  - CHEST PT  - IVF    # SEPSIS S/T UTI  - PLACED ON ROCEPHIN, F/U BCX [NGTD] AND UCX [ECOLI]  - ID CONSULT    # ACUTE ENCEPHALOPATHY ON CHRONIC DEMENTIA  - NOTED CT HEAD  - RECOMMENDED FOR REPEAT CTA HEAD AND NECK VS. MRA HEAD/NECK + MR BRAIN PER NEUROLOGY  - F/U PT EVAL  - F/U ST EVAL  - ASA + PLAVIX  - NOTED UTOX, ABG  - NEUROLOGY CONSULT    # CONSTIPATION, FECAL IMPACTION  - BOWEL REGIMEN  - PLANNED FOR ENEMA    # NOAH  - IMPROVED S/P IVF    # AMBULATORY DYSFUNCTION S/T OA, OP, HX OF ?CVA  - F/U PT EVAL    # VITAMIN D DEFICIENCY  - REPLETE WITH SUPPLEMENT    # SEVERE PROTEIN CALORIE MALNUTRITION  - TEMPORAL, SHOULDER + PELVIC GIRDLE WASTING  - SUPPLEMENTAL NUTRITION    # PATENT IS HIGH RISK FOR ASPIRATION DESPITE PRECAUTIONS IN PLACE    # ? HX OF CVA  # ? HX OF PE IN RLL IN 2022 - ? UNTREATED DUE TO RECURRENT FALLS     # HTN  # HLD  # LEGALLY BLIND, GLAUCOMA  # GI AND DVT PPX

## 2024-02-19 NOTE — DISCHARGE NOTE PROVIDER - CONDITIONS AT DISCHARGE
pt seen at bedside and plan discussed with attending who is in agreement that pt is stable for discharge to [facility] today

## 2024-02-19 NOTE — DISCHARGE NOTE PROVIDER - HOSPITAL COURSE
84 y/o F who is legally blind and totally dependent for ADLS with PMH of HTN, HLD, PE, PAD, CVA with residual dysphagia?. dementia (AAOx1-2 at baseline) who was sent by NH for altered mental status. Patient tested positive for a UTI. Patient is being admitted for acute encephalopathy 2/2 to UTI.  Ucx grew E coli. ID and Neuro consulted. started Baclofen per neurology. Neurology concerning possible  new left frontal ischemic infarct.  No IV tpa given because time of onset is unknown, reccs to start ASA/Plavix/statin, SLP/PT eval and reccs to repeat CT/CTA with perfusion or MRI MRA w/o contrast of head.       Medically optimized for discharge  Note this is just a brief course for full course please refer to daily progress and consult notes.     82 y/o F who is legally blind and totally dependent for ADLS with PMH of HTN, HLD, PE, PAD, CVA with residual dysphagia?. dementia (AAOx1-2 at baseline) who was sent by NH for altered mental status. Patient tested positive for a UTI, and placed on IV abx. Patient is being admitted for acute encephalopathy 2/2 to UTI.  Ucx grew E coli. ID and Neuro consulted. Neurology concerning possible  new left frontal ischemic infarct.  No IV tpa given because time of onset is unknown, reccs to start ASA/Plavix/statin. CTA of head and Brain perfusion scan negative for acute infarct. Palliative care consulted.  PT evaluation recommended LTC/SNF, not a good candidate for rehab      INCOMPLETE *******    Medically optimized for discharge  Note this is just a brief course for full course please refer to daily progress and consult notes.     84 y/o F who is legally blind and totally dependent for ADLS with PMH of HTN, HLD, PE, PAD, CVA with residual dysphagia?. dementia (AAOx1-2 at baseline) who was sent by NH for altered mental status. Patient tested positive for a UTI, and placed on IV abx. Patient is being admitted for acute encephalopathy 2/2 to UTI.  Ucx grew E coli. Treated with Ceftriaxone. ID and Neuro consulted. Neurology concerning possible  new left frontal ischemic infarct.  No IV tpa given because time of onset is unknown, reccs to start ASA/Plavix/statin. CTA of head and Brain perfusion scan negative for acute infarct. Palliative care consulted. Hospice appropriate.  PT evaluation recommended LTC/SNF, not a good candidate for rehab  Family agreed to focus on comfort measures only  DNR/DNI. MEWS exempt    Pt to be discharged to [place]        82 y/o F who is legally blind and totally dependent for ADLS with PMH of HTN, HLD, PE, PAD, CVA with residual dysphagia?. dementia (AAOx1-2 at baseline) who was sent by NH for altered mental status. Patient tested positive for a UTI, and placed on IV abx. Patient is being admitted for acute encephalopathy 2/2 to UTI.  Ucx grew E coli. Treated with Ceftriaxone. ID and Neuro consulted. Neurology concerning possible  new left frontal ischemic infarct.  No IV tpa given because time of onset is unknown, reccs to start ASA/Plavix/statin. CTA of head and Brain perfusion scan negative for acute infarct. Palliative care consulted. Hospice appropriate.  PT evaluation recommended LTC/SNF, not a good candidate for rehab  Family agreed to focus on comfort measures only  DNR/DNI. MEWS exempt    Pt to be discharged to [place]   Please note that this a brief summary of hospital course please refer to daily progress notes and consult notes for full course and events

## 2024-02-19 NOTE — PHYSICAL THERAPY INITIAL EVALUATION ADULT - DIAGNOSIS, PT EVAL
Pt. present w/deficits in  Body Structures/Function including strength, balance leading to deficits in performing bed mobility, transfer and ambulation endurance.

## 2024-02-20 DIAGNOSIS — E43 UNSPECIFIED SEVERE PROTEIN-CALORIE MALNUTRITION: ICD-10-CM

## 2024-02-20 DIAGNOSIS — F03.C0 UNSPECIFIED DEMENTIA, SEVERE, WITHOUT BEHAVIORAL DISTURBANCE, PSYCHOTIC DISTURBANCE, MOOD DISTURBANCE, AND ANXIETY: ICD-10-CM

## 2024-02-20 DIAGNOSIS — Z51.5 ENCOUNTER FOR PALLIATIVE CARE: ICD-10-CM

## 2024-02-20 DIAGNOSIS — R53.81 OTHER MALAISE: ICD-10-CM

## 2024-02-20 LAB
ANION GAP SERPL CALC-SCNC: 6 MMOL/L — SIGNIFICANT CHANGE UP (ref 5–17)
APPEARANCE UR: ABNORMAL
BACTERIA # UR AUTO: ABNORMAL /HPF
BILIRUB UR-MCNC: ABNORMAL
BUN SERPL-MCNC: 20 MG/DL — HIGH (ref 7–18)
CALCIUM SERPL-MCNC: 9 MG/DL — SIGNIFICANT CHANGE UP (ref 8.4–10.5)
CHLORIDE SERPL-SCNC: 108 MMOL/L — SIGNIFICANT CHANGE UP (ref 96–108)
CO2 SERPL-SCNC: 22 MMOL/L — SIGNIFICANT CHANGE UP (ref 22–31)
COLOR SPEC: SIGNIFICANT CHANGE UP
CREAT SERPL-MCNC: 0.72 MG/DL — SIGNIFICANT CHANGE UP (ref 0.5–1.3)
DIFF PNL FLD: NEGATIVE — SIGNIFICANT CHANGE UP
EGFR: 82 ML/MIN/1.73M2 — SIGNIFICANT CHANGE UP
GLUCOSE SERPL-MCNC: 134 MG/DL — HIGH (ref 70–99)
GLUCOSE UR QL: NEGATIVE MG/DL — SIGNIFICANT CHANGE UP
HCT VFR BLD CALC: 33.5 % — LOW (ref 34.5–45)
HGB BLD-MCNC: 10.8 G/DL — LOW (ref 11.5–15.5)
KETONES UR-MCNC: ABNORMAL MG/DL
LEUKOCYTE ESTERASE UR-ACNC: NEGATIVE — SIGNIFICANT CHANGE UP
MCHC RBC-ENTMCNC: 29.1 PG — SIGNIFICANT CHANGE UP (ref 27–34)
MCHC RBC-ENTMCNC: 32.2 GM/DL — SIGNIFICANT CHANGE UP (ref 32–36)
MCV RBC AUTO: 90.3 FL — SIGNIFICANT CHANGE UP (ref 80–100)
NITRITE UR-MCNC: NEGATIVE — SIGNIFICANT CHANGE UP
NRBC # BLD: 0 /100 WBCS — SIGNIFICANT CHANGE UP (ref 0–0)
PH UR: 5 — SIGNIFICANT CHANGE UP (ref 5–8)
PLATELET # BLD AUTO: 272 K/UL — SIGNIFICANT CHANGE UP (ref 150–400)
POTASSIUM SERPL-MCNC: 3.5 MMOL/L — SIGNIFICANT CHANGE UP (ref 3.5–5.3)
POTASSIUM SERPL-SCNC: 3.5 MMOL/L — SIGNIFICANT CHANGE UP (ref 3.5–5.3)
PROT UR-MCNC: 30 MG/DL
RBC # BLD: 3.71 M/UL — LOW (ref 3.8–5.2)
RBC # FLD: 13.5 % — SIGNIFICANT CHANGE UP (ref 10.3–14.5)
RBC CASTS # UR COMP ASSIST: 0 /HPF — SIGNIFICANT CHANGE UP (ref 0–4)
SODIUM SERPL-SCNC: 136 MMOL/L — SIGNIFICANT CHANGE UP (ref 135–145)
SP GR SPEC: 1.04 — HIGH (ref 1–1.03)
UROBILINOGEN FLD QL: 1 MG/DL — SIGNIFICANT CHANGE UP (ref 0.2–1)
WBC # BLD: 5.73 K/UL — SIGNIFICANT CHANGE UP (ref 3.8–10.5)
WBC # FLD AUTO: 5.73 K/UL — SIGNIFICANT CHANGE UP (ref 3.8–10.5)
WBC UR QL: 3 /HPF — SIGNIFICANT CHANGE UP (ref 0–5)

## 2024-02-20 PROCEDURE — 99497 ADVNCD CARE PLAN 30 MIN: CPT | Mod: 25

## 2024-02-20 PROCEDURE — 99223 1ST HOSP IP/OBS HIGH 75: CPT

## 2024-02-20 RX ADMIN — Medication 1 SPRAY(S): at 06:21

## 2024-02-20 RX ADMIN — CEFTRIAXONE 100 MILLIGRAM(S): 500 INJECTION, POWDER, FOR SOLUTION INTRAMUSCULAR; INTRAVENOUS at 18:10

## 2024-02-20 RX ADMIN — Medication 81 MILLIGRAM(S): at 12:54

## 2024-02-20 RX ADMIN — Medication 1 SPRAY(S): at 18:11

## 2024-02-20 RX ADMIN — Medication 1 TABLET(S): at 12:54

## 2024-02-20 RX ADMIN — Medication 100 MILLIGRAM(S): at 12:53

## 2024-02-20 RX ADMIN — Medication 1 APPLICATION(S): at 18:10

## 2024-02-20 RX ADMIN — AMLODIPINE BESYLATE 10 MILLIGRAM(S): 2.5 TABLET ORAL at 06:20

## 2024-02-20 RX ADMIN — ATORVASTATIN CALCIUM 20 MILLIGRAM(S): 80 TABLET, FILM COATED ORAL at 21:46

## 2024-02-20 RX ADMIN — ENOXAPARIN SODIUM 30 MILLIGRAM(S): 100 INJECTION SUBCUTANEOUS at 06:21

## 2024-02-20 RX ADMIN — Medication 1000 UNIT(S): at 12:53

## 2024-02-20 RX ADMIN — Medication 1 APPLICATION(S): at 06:20

## 2024-02-20 RX ADMIN — Medication 1 MILLIGRAM(S): at 12:53

## 2024-02-20 RX ADMIN — POLYETHYLENE GLYCOL 3350 17 GRAM(S): 17 POWDER, FOR SOLUTION ORAL at 12:53

## 2024-02-20 RX ADMIN — CLOPIDOGREL BISULFATE 75 MILLIGRAM(S): 75 TABLET, FILM COATED ORAL at 12:53

## 2024-02-20 RX ADMIN — Medication 100 MILLIGRAM(S): at 06:20

## 2024-02-20 RX ADMIN — Medication 100 MILLIGRAM(S): at 18:10

## 2024-02-20 RX ADMIN — LATANOPROST 1 DROP(S): 0.05 SOLUTION/ DROPS OPHTHALMIC; TOPICAL at 21:46

## 2024-02-20 NOTE — CONSULT NOTE ADULT - PROBLEM SELECTOR RECOMMENDATION 4
Patient is now comfort measures only, no further blood draws  EOL education and counseling given to family  Please see discussion noted above in GOC conversation

## 2024-02-20 NOTE — CONSULT NOTE ADULT - PROBLEM SELECTOR RECOMMENDATION 9
Hx of CVA. AOx0. Bedbound. Total care.  No behavior issues. FAST 7d.  Hospice appropriate.  Discussed dementia trajectory and provided anticipatory guidance.  Educated pt's spouse about hospice philosophy, and services provided.    Family agreed to focus on comfort measures only  DNR/DNI/CMO/MEWS exempt  Pt is for discharge to LTC w Hospice.  SW referral made.  CT head: chronic ischemic changes with atrophy, most prevalent in the frontal temporal region

## 2024-02-20 NOTE — CONSULT NOTE ADULT - CONVERSATION DETAILS
Counseled pt's spouse Triston that she has progressed to the very end of her disease trajectory, and that she certainly is hospice appropriate, with a likely prognosis of weeks to a couple of months at most.  Discussed that patient now is not swallowing and likely will continue to have worsening of her dysphagia with little recovery.    Discussed that further treatment, including continued use of IV antibiotics, is unlikely to change the outcome, but prolong suffering.  After further discussion, Triston elected to proceed with transition to comfort measures only, no PEG, no further antibiotics, no further testing/blood draws, and no escalation care.  Discussed risks/benefits of LST such as CPR/ intubation, artificial nutrition and PEG in the context of advanced dementia and debility. Family aware these invasive measures will not optimize the pt's life but may cause more stress and pain. New MOLST drafted: DNR/DNI/no feeding tube/no IVF/Comfort hospice.  Chaplaincy offered and accepted.  All questions answered.  Support provided.  d/w primary team

## 2024-02-20 NOTE — CONSULT NOTE ADULT - PROBLEM SELECTOR RECOMMENDATION 3
Pt is bedbound. Total care.  + Hypoalbuminemia, high risk for skin failure.  Supportive care  Freq positioning and off loading    Comfort only    PT eval noted

## 2024-02-20 NOTE — PROGRESS NOTE ADULT - SUBJECTIVE AND OBJECTIVE BOX
Patient is a 84y old  Female who presents with a chief complaint of AMS. (19 Feb 2024 17:54)    PATIENT IS SEEN AND EXAMINED IN MEDICAL FLOOR.  POOJA [    ]    SHIRLEY [   ]      GT [   ]    ALLERGIES:  No Known Allergies      Daily     Daily     VITALS:    Vital Signs Last 24 Hrs  T(C): 36.6 (20 Feb 2024 05:00), Max: 37.1 (19 Feb 2024 20:32)  T(F): 97.9 (20 Feb 2024 05:00), Max: 98.7 (19 Feb 2024 20:32)  HR: 88 (20 Feb 2024 05:00) (88 - 103)  BP: 137/72 (20 Feb 2024 05:00) (134/76 - 144/68)  BP(mean): --  RR: 18 (20 Feb 2024 05:00) (17 - 18)  SpO2: 97% (20 Feb 2024 05:00) (95% - 97%)    Parameters below as of 20 Feb 2024 05:00  Patient On (Oxygen Delivery Method): room air        LABS:    CBC Full  -  ( 20 Feb 2024 06:25 )  WBC Count : 5.73 K/uL  RBC Count : 3.71 M/uL  Hemoglobin : 10.8 g/dL  Hematocrit : 33.5 %  Platelet Count - Automated : 272 K/uL  Mean Cell Volume : 90.3 fl  Mean Cell Hemoglobin : 29.1 pg  Mean Cell Hemoglobin Concentration : 32.2 gm/dL  Auto Neutrophil # : x  Auto Lymphocyte # : x  Auto Monocyte # : x  Auto Eosinophil # : x  Auto Basophil # : x  Auto Neutrophil % : x  Auto Lymphocyte % : x  Auto Monocyte % : x  Auto Eosinophil % : x  Auto Basophil % : x      02-20    136  |  108  |  20<H>  ----------------------------<  134<H>  3.5   |  22  |  0.72    Ca    9.0      20 Feb 2024 06:25      CAPILLARY BLOOD GLUCOSE              Creatinine Trend: 0.72<--, 0.77<--, 0.74<--, 0.70<--, 0.63<--, 0.89<--  I&O's Summary          Clean Catch Clean Catch (Midstream)  02-14 @ 17:43   >100,000 CFU/ml Escherichia coli  --  Escherichia coli      .Blood Blood-Peripheral  02-14 @ 16:45   No growth at 5 days  --  --      .Blood Blood-Peripheral  02-14 @ 16:30   No growth at 5 days  --  --          MEDICATIONS:    MEDICATIONS  (STANDING):  amLODIPine   Tablet 10 milliGRAM(s) Oral daily  aspirin  chewable 81 milliGRAM(s) Oral daily  atorvastatin 20 milliGRAM(s) Oral at bedtime  bacitracin   Ointment 1 Application(s) Topical two times a day  cefTRIAXone   IVPB 1000 milliGRAM(s) IV Intermittent every 24 hours  cholecalciferol 1000 Unit(s) Oral daily  clopidogrel Tablet 75 milliGRAM(s) Oral daily  dextrose 5% + sodium chloride 0.9%. 1000 milliLiter(s) (75 mL/Hr) IV Continuous <Continuous>  enoxaparin Injectable 30 milliGRAM(s) SubCutaneous every 24 hours  fluticasone propionate 50 MICROgram(s)/spray Nasal Spray 1 Spray(s) Both Nostrils two times a day  folic acid 1 milliGRAM(s) Oral daily  guaiFENesin Oral Liquid (Sugar-Free) 100 milliGRAM(s) Oral every 6 hours  latanoprost 0.005% Ophthalmic Solution 1 Drop(s) Both EYES at bedtime  multivitamin 1 Tablet(s) Oral daily  polyethylene glycol 3350 17 Gram(s) Oral daily  senna 2 Tablet(s) Oral at bedtime      MEDICATIONS  (PRN):  acetaminophen     Tablet .. 650 milliGRAM(s) Oral every 6 hours PRN Temp greater or equal to 38C (100.4F), Mild Pain (1 - 3)  ondansetron Injectable 4 milliGRAM(s) IV Push every 8 hours PRN Nausea and/or Vomiting      REVIEW OF SYSTEMS:                           ALL ROS DONE [ X   ]    CONSTITUTIONAL:  LETHARGIC [   ], FEVER [   ], UNRESPONSIVE [   ]  CVS:  CP  [   ], SOB, [   ], PALPITATIONS [   ], DIZZYNESS [   ]  RS: COUGH [   ], SPUTUM [   ]  GI: ABDOMINAL PAIN [   ], NAUSEA [   ], VOMITINGS [   ], DIARRHEA [   ], CONSTIPATION [   ]  :  DYSURIA [   ], NOCTURIA [   ], INCREASED FREQUENCY [   ], DRIBLING [   ],  SKELETAL: PAINFUL JOINTS [   ], SWOLLEN JOINTS [   ], NECK ACHE [   ], LOW BACK ACHE [   ],  SKIN : ULCERS [   ], RASH [   ], ITCHING [   ]  CNS: HEAD ACHE [   ], DOUBLE VISION [   ], BLURRED VISION [   ], AMS / CONFUSION [   ], SEIZURES [   ], WEAKNESS [   ],TINGLING / NUMBNESS [   ]      PHYSICAL EXAMINATION:  GENERAL APPEARANCE: NO DISTRESS  HEENT:  NO PALLOR, NO  JVD,  NO   NODES, NECK SUPPLE  CVS: S1 +, S2 +,   RS: AEEB,  OCCASIONAL  RALES +,   NO RONCHI  ABD: SOFT, NT, NO, BS +  EXT: NO PE  SKIN: WARM,   SKELETAL:  ROM ACCEPTABLE [ WHEN EXAMINER MOVES ARMS/LEGS, BUT IS NOT CONSISTENTLY PARTICIPATING IN EXAM - ?DUE TO DEMENTIA]  CNS:  AAO X 1    RADIOLOGY :    RADIOLOGY AND READINGS REVIEWED    ASSESSMENT :     Altered mental status    Hypertension    Hyperlipidemia    Dementia    Pulmonary embolism        PLAN:  HPI:  Patient is a 84 y/o F who is legally blind and totally dependent for ADLS with PMH of HTN, HLD, PE, PAD, CVA with residual dysphagia?. dementia (AAOx1-2 at baseline) who was sent by NH for altered mental status. Patient is usually interactive and talking but this morning patient was found to be lethargic and minimally interactive. Due to this patient was sent to the hospital. Patient was seen at bedside but patient is minimally responsive and only making grunting sounds to painful stimuli. No ROS could be obtained from the patient due to altered mental status.    (14 Feb 2024 21:33)    # [2/18] CASE DISCUSSED AT LENGTH WITH PATIENT'S  JAREK AND FRIEND AT BEDSIDE. ALL QUESTIONS ANSWERED. DISCUSSED REGARDING PATIENT'S ADVANCED DEMENTIA, DECLINING MOBILITY, DECLINING NUTRITIONAL STATUS. DISCUSSED THAT PATIENT'S PROGNOSIS IS POOR.  FAMILY VERBALIZED UNDERSTANDING. REGARDING GOALS OF CARE FAMILY WOULD LIKE TO DISCUSS AMONGST THEMSELVES. PALLIATIVE CARE CONSULT.      # FEVER, ACUTE URTI, CORONAVIRUS  - PRN ANTITUSSIVE  - FLONASE  - PRN DUONEB  - CHEST PT  - IVF    # SEPSIS S/T UTI  - PLACED ON ROCEPHIN, F/U BCX [NGTD] AND UCX [ECOLI]  - ID CONSULT    # ACUTE ENCEPHALOPATHY ON CHRONIC DEMENTIA  - NOTED CT HEAD  - RECOMMENDED FOR REPEAT CTA HEAD AND NECK VS. MRA HEAD/NECK + MR BRAIN PER NEUROLOGY  - F/U PT EVAL  - F/U ST EVAL  - ASA + PLAVIX  - NOTED UTOX, ABG  - NEUROLOGY CONSULT    # CONSTIPATION, FECAL IMPACTION  - BOWEL REGIMEN  - PLANNED FOR ENEMA    # NOAH  - IMPROVED S/P IVF    # AMBULATORY DYSFUNCTION S/T OA, OP, HX OF ?CVA  - F/U PT EVAL    # VITAMIN D DEFICIENCY  - REPLETE WITH SUPPLEMENT    # SEVERE PROTEIN CALORIE MALNUTRITION  - TEMPORAL, SHOULDER + PELVIC GIRDLE WASTING  - SUPPLEMENTAL NUTRITION    # PATENT IS HIGH RISK FOR ASPIRATION DESPITE PRECAUTIONS IN PLACE    # ? HX OF CVA  # ? HX OF PE IN RLL IN 2022 - ? UNTREATED DUE TO RECURRENT FALLS     # HTN  # HLD  # LEGALLY BLIND, GLAUCOMA  # GI AND DVT PPX   Patient is a 84y old  Female who presents with a chief complaint of AMS. (19 Feb 2024 17:54)    PATIENT IS SEEN AND EXAMINED IN MEDICAL FLOOR.      ALLERGIES:  No Known Allergies      VITALS:    Vital Signs Last 24 Hrs  T(C): 36.6 (20 Feb 2024 05:00), Max: 37.1 (19 Feb 2024 20:32)  T(F): 97.9 (20 Feb 2024 05:00), Max: 98.7 (19 Feb 2024 20:32)  HR: 88 (20 Feb 2024 05:00) (88 - 103)  BP: 137/72 (20 Feb 2024 05:00) (134/76 - 144/68)  BP(mean): --  RR: 18 (20 Feb 2024 05:00) (17 - 18)  SpO2: 97% (20 Feb 2024 05:00) (95% - 97%)    Parameters below as of 20 Feb 2024 05:00  Patient On (Oxygen Delivery Method): room air        LABS:    CBC Full  -  ( 20 Feb 2024 06:25 )  WBC Count : 5.73 K/uL  RBC Count : 3.71 M/uL  Hemoglobin : 10.8 g/dL  Hematocrit : 33.5 %  Platelet Count - Automated : 272 K/uL  Mean Cell Volume : 90.3 fl  Mean Cell Hemoglobin : 29.1 pg  Mean Cell Hemoglobin Concentration : 32.2 gm/dL  Auto Neutrophil # : x  Auto Lymphocyte # : x  Auto Monocyte # : x  Auto Eosinophil # : x  Auto Basophil # : x  Auto Neutrophil % : x  Auto Lymphocyte % : x  Auto Monocyte % : x  Auto Eosinophil % : x  Auto Basophil % : x      02-20    136  |  108  |  20<H>  ----------------------------<  134<H>  3.5   |  22  |  0.72    Ca    9.0      20 Feb 2024 06:25      CAPILLARY BLOOD GLUCOSE              Creatinine Trend: 0.72<--, 0.77<--, 0.74<--, 0.70<--, 0.63<--, 0.89<--  I&O's Summary          Clean Catch Clean Catch (Midstream)  02-14 @ 17:43   >100,000 CFU/ml Escherichia coli  --  Escherichia coli      .Blood Blood-Peripheral  02-14 @ 16:45   No growth at 5 days  --  --      .Blood Blood-Peripheral  02-14 @ 16:30   No growth at 5 days  --  --          MEDICATIONS:    MEDICATIONS  (STANDING):  amLODIPine   Tablet 10 milliGRAM(s) Oral daily  aspirin  chewable 81 milliGRAM(s) Oral daily  atorvastatin 20 milliGRAM(s) Oral at bedtime  bacitracin   Ointment 1 Application(s) Topical two times a day  cefTRIAXone   IVPB 1000 milliGRAM(s) IV Intermittent every 24 hours  cholecalciferol 1000 Unit(s) Oral daily  clopidogrel Tablet 75 milliGRAM(s) Oral daily  dextrose 5% + sodium chloride 0.9%. 1000 milliLiter(s) (75 mL/Hr) IV Continuous <Continuous>  enoxaparin Injectable 30 milliGRAM(s) SubCutaneous every 24 hours  fluticasone propionate 50 MICROgram(s)/spray Nasal Spray 1 Spray(s) Both Nostrils two times a day  folic acid 1 milliGRAM(s) Oral daily  guaiFENesin Oral Liquid (Sugar-Free) 100 milliGRAM(s) Oral every 6 hours  latanoprost 0.005% Ophthalmic Solution 1 Drop(s) Both EYES at bedtime  multivitamin 1 Tablet(s) Oral daily  polyethylene glycol 3350 17 Gram(s) Oral daily  senna 2 Tablet(s) Oral at bedtime      MEDICATIONS  (PRN):  acetaminophen     Tablet .. 650 milliGRAM(s) Oral every 6 hours PRN Temp greater or equal to 38C (100.4F), Mild Pain (1 - 3)  ondansetron Injectable 4 milliGRAM(s) IV Push every 8 hours PRN Nausea and/or Vomiting      REVIEW OF SYSTEMS:                           ALL ROS DONE [ X   ]    CONSTITUTIONAL:  LETHARGIC [   ], FEVER [   ], UNRESPONSIVE [   ]  CVS:  CP  [   ], SOB, [   ], PALPITATIONS [   ], DIZZYNESS [   ]  RS: COUGH [   ], SPUTUM [   ]  GI: ABDOMINAL PAIN [   ], NAUSEA [   ], VOMITINGS [   ], DIARRHEA [   ], CONSTIPATION [   ]  :  DYSURIA [   ], NOCTURIA [   ], INCREASED FREQUENCY [   ], DRIBLING [   ],  SKELETAL: PAINFUL JOINTS [   ], SWOLLEN JOINTS [   ], NECK ACHE [   ], LOW BACK ACHE [   ],  SKIN : ULCERS [   ], RASH [   ], ITCHING [   ]  CNS: HEAD ACHE [   ], DOUBLE VISION [   ], BLURRED VISION [   ], AMS / CONFUSION [   ], SEIZURES [   ], WEAKNESS [   ],TINGLING / NUMBNESS [   ]      PHYSICAL EXAMINATION:  GENERAL APPEARANCE: NO DISTRESS  HEENT:  NO PALLOR, NO  JVD,  NO   NODES, NECK SUPPLE  CVS: S1 +, S2 +,   RS: AEEB,  OCCASIONAL  RALES +,   NO RONCHI  ABD: SOFT, NT, NO, BS +  EXT: NO PE  SKIN: WARM,   SKELETAL:  ROM ACCEPTABLE [ WHEN EXAMINER MOVES ARMS/LEGS, BUT IS NOT CONSISTENTLY PARTICIPATING IN EXAM - ?DUE TO DEMENTIA]  CNS:  AAO X 1    RADIOLOGY :    RADIOLOGY AND READINGS REVIEWED    ASSESSMENT :     Altered mental status    Hypertension    Hyperlipidemia    Dementia    Pulmonary embolism        PLAN:  HPI:  Patient is a 84 y/o F who is legally blind and totally dependent for ADLS with PMH of HTN, HLD, PE, PAD, CVA with residual dysphagia?. dementia (AAOx1-2 at baseline) who was sent by NH for altered mental status. Patient is usually interactive and talking but this morning patient was found to be lethargic and minimally interactive. Due to this patient was sent to the hospital. Patient was seen at bedside but patient is minimally responsive and only making grunting sounds to painful stimuli. No ROS could be obtained from the patient due to altered mental status.    (14 Feb 2024 21:33)    # FAMILY DISCUSSED W/ PALLIATIVE CARE TEAM - THEY WISH FOR [facility] DNR/DNI/COMFORT CARE. SW TEAM WORKING WITH FAMILY TO ARRANGE FOR HOSPICE/LTP.    # [2/18] CASE DISCUSSED AT LENGTH WITH PATIENT'S  JAREK AND FRIEND AT BEDSIDE. ALL QUESTIONS ANSWERED. DISCUSSED REGARDING PATIENT'S ADVANCED DEMENTIA, DECLINING MOBILITY, DECLINING NUTRITIONAL STATUS. DISCUSSED THAT PATIENT'S PROGNOSIS IS POOR.  FAMILY VERBALIZED UNDERSTANDING. REGARDING GOALS OF CARE FAMILY WOULD LIKE TO DISCUSS AMONGST THEMSELVES. PALLIATIVE CARE CONSULT.      # FEVER, ACUTE URTI, CORONAVIRUS  - PRN ANTITUSSIVE  - FLONASE  - PRN DUONEB  - CHEST PT  - IVF    # SEPSIS S/T UTI  - PLACED ON ROCEPHIN, F/U BCX [NGTD] AND UCX [ECOLI]  - ID CONSULT    # ACUTE ENCEPHALOPATHY ON CHRONIC DEMENTIA  - NOTED CT HEAD  - RECOMMENDED FOR REPEAT CTA HEAD AND NECK VS. MRA HEAD/NECK + MR BRAIN PER NEUROLOGY  - F/U PT EVAL  - F/U ST EVAL  - ASA + PLAVIX  - NOTED UTOX, ABG  - NEUROLOGY CONSULT    # CONSTIPATION, FECAL IMPACTION  - BOWEL REGIMEN  - PLANNED FOR ENEMA    # NOAH  - IMPROVED S/P IVF    # AMBULATORY DYSFUNCTION S/T OA, OP, HX OF ?CVA  - F/U PT EVAL    # VITAMIN D DEFICIENCY  - REPLETE WITH SUPPLEMENT    # SEVERE PROTEIN CALORIE MALNUTRITION  - TEMPORAL, SHOULDER + PELVIC GIRDLE WASTING  - SUPPLEMENTAL NUTRITION    # PATENT IS HIGH RISK FOR ASPIRATION DESPITE PRECAUTIONS IN PLACE    # ? HX OF CVA  # ? HX OF PE IN RLL IN 2022 - ? UNTREATED DUE TO RECURRENT FALLS     # HTN  # HLD  # LEGALLY BLIND, GLAUCOMA  # GI AND DVT PPX

## 2024-02-20 NOTE — CONSULT NOTE ADULT - SUBJECTIVE AND OBJECTIVE BOX
[facility] Geriatric and Palliative Consult Service:  Jess Segura DO: cell ([phone])  Sherif Denton MD: cell ([phone])  Trace Bailey NP: cell ([phone])   Wang Doshi SW: cell ([phone])   Kat Quintanilla NP: via [employer] Teams    Can contact any Palliative Team member via [employer] Teams for consults and questions      HPI:  Patient is a 82 y/o F who is legally blind and totally dependent for ADLS with PMH of HTN, HLD, PE, PAD, CVA with residual dysphagia?. dementia (AAOx1-2 at baseline) who was sent by NH for altered mental status. Patient is usually interactive and talking but this morning patient was found to be lethargic and minimally interactive. Due to this patient was sent to the hospital. Patient was seen at bedside but patient is minimally responsive and only making grunting sounds to painful stimuli. No ROS could be obtained from the patient due to altered mental status.    (2024 21:33)    Interval hx: Pt seen and examined at the bedside, AOx0.  Uttering incomprehensible speech.  NAD    PAST MEDICAL & SURGICAL HISTORY:  Hypertension      Hyperlipidemia      Dementia      Pulmonary embolism          SOCIAL HISTORY:    Admitted from:  Neil Segura   Pt's spouse medical decisions.  [ none ] Substance abuse, [ none ] Tobacco hx, [ none ] Alcohol hx, [ none ] Home Opioid Hx    Church: [employer]  Triston Beckwith (spouse)        Phone# [phone]    FAMILY HISTORY:   unable to obtain from patient due to poor mentation, family unable to give information, see H&P for history  Baseline ADLs (prior to admission): minimally ambulatory, required extensive assist w ADLs    Allergies    No Known Allergies    Intolerances      Present Symptoms: Mild, Moderate, Severe   Unable to obtain due to poor mentation]    CPOT:    https://www.sccm.org/getattachment/[email or web address]-8r7h-3c3o-9443f9163o4a/Critical-Care-Pain-Observation-Tool-(CPOT)  PAIN AD Score:   [email or web address] (press ctrl +  left click to view)      MEDICATIONS  (STANDING):  amLODIPine   Tablet 10 milliGRAM(s) Oral daily  aspirin  chewable 81 milliGRAM(s) Oral daily  atorvastatin 20 milliGRAM(s) Oral at bedtime  bacitracin   Ointment 1 Application(s) Topical two times a day  cefTRIAXone   IVPB 1000 milliGRAM(s) IV Intermittent every 24 hours  cholecalciferol 1000 Unit(s) Oral daily  clopidogrel Tablet 75 milliGRAM(s) Oral daily  dextrose 5% + sodium chloride 0.9%. 1000 milliLiter(s) (75 mL/Hr) IV Continuous <Continuous>  enoxaparin Injectable 30 milliGRAM(s) SubCutaneous every 24 hours  fluticasone propionate 50 MICROgram(s)/spray Nasal Spray 1 Spray(s) Both Nostrils two times a day  folic acid 1 milliGRAM(s) Oral daily  guaiFENesin Oral Liquid (Sugar-Free) 100 milliGRAM(s) Oral every 6 hours  latanoprost 0.005% Ophthalmic Solution 1 Drop(s) Both EYES at bedtime  multivitamin 1 Tablet(s) Oral daily  polyethylene glycol 3350 17 Gram(s) Oral daily  senna 2 Tablet(s) Oral at bedtime    MEDICATIONS  (PRN):  acetaminophen     Tablet .. 650 milliGRAM(s) Oral every 6 hours PRN Temp greater or equal to 38C (100.4F), Mild Pain (1 - 3)  ondansetron Injectable 4 milliGRAM(s) IV Push every 8 hours PRN Nausea and/or Vomiting      PHYSICAL EXAM:  Vital Signs Last 24 Hrs  T(C): 36.8 (2024 13:17), Max: 37.1 (2024 20:32)  T(F): 98.3 (2024 13:17), Max: 98.7 (2024 20:32)  HR: 97 (2024 13:17) (88 - 99)  BP: 163/70 (2024 13:17) (134/76 - 163/70)  BP(mean): --  RR: 18 (2024 13:17) (18 - 18)  SpO2: 96% (2024 13:17) (96% - 97%)    Parameters below as of 2024 13:17  Patient On (Oxygen Delivery Method): room air      PHYSICAL  General: Frail, cachectic woman, Aox2 confused.  NAD    Palliative Performance Scale/Karnofsky Score: 20%  http://npcrc.org/files/news/palliative_performance_scale_ppsv2.pdf    HEENT: temporal wasting, legally blind,  dry MM, neck supple  Lungs: unlabored on RA  CV: RRR, S1S2  GI: soft non distended non tender  incontinent  : incontinent  Musculoskeletal: weakness x4, contracted LE, bedbound, no edema  Skin:  poor skin turgor, Stage 2 Pressure Injury to the Coccyx  Neuro: no focal deficits, severe cognitive decline, unable to follow commands  Oral intake ability: poor po intake    LABS:                        10.8   5.73  )-----------( 272      ( 2024 06:25 )             33.5     02-    136  |  108  |  20<H>  ----------------------------<  134<H>  3.5   |  22  |  0.72    Ca    9.0      2024 06:25      Urinalysis Basic - ( 2024 06:38 )    Color: Dark Yellow / Appearance: Turbid / S.042 / pH: x  Gluc: x / Ketone: Trace mg/dL  / Bili: Small / Urobili: 1.0 mg/dL   Blood: x / Protein: 30 mg/dL / Nitrite: Negative   Leuk Esterase: Negative / RBC: 0 /HPF / WBC 3 /HPF   Sq Epi: x / Non Sq Epi: x / Bacteria: Many /HPF    < from: CT Angio Head w/ IV Cont (24 @ 18:17) >    ACC: [id number] EXAM:  CT ANGIO BRAIN (W)AW IC   ORDERED BY: SUHA SEGURA     ACC: [id number] EXAM:  CT ANGIO NECK (W)AW IC   ORDERED BY: NIMESH CAZARES     ACC: [id number] EXAM:  CT PERFUSION W MAPS IC   ORDERED BY: NIMESH CAZARES     PROCEDURE DATE:  2024          INTERPRETATION:  CLINICAL INFORMATION: Altered mental status.    COMPARISON: Noncontrast head CT scans from 2024 and 2024.    CONTRAST/COMPLICATIONS:  IV Contrast: Omnipaque 350.  130 cc administered.  70 cc discarded  Complications: None reported at time of study completion    TECHNIQUE:  1.  Contrast-enhanced CT perfusion of the brain was performed.  Perfusion   maps were automatically generated using iSchemaView RAPID software.  2.  Contrast enhanced CT angiography of the neck and head was performed.    3D and MIP reformats were generated.    FINDINGS:  CT PERFUSION:  There is no evidence of a core infarct or penumbra of ischemic tissue.    Trace CBF abnormality, with volume of 6 mL, located along the surface of   the left cerebral hemisphere appears to be outside the brain parenchyma   and located within CSF; this is compatible with artifact.    The following measurements were obtained on perfusion maps:  Core infarct (CBF <  30%:): 6 mL  Penumbra (Tmax>6s): 0 mL  Mismatched volume: -6 mL  Mismatched ratio: None    CT ANGIOGRAPHY NECK:  Thoracic aorta and branch vessels: Three-vessel arch with mild   atherosclerotic calcification..  No occlusion, flow limiting stenosis or   dissection.    Cervical carotid systems: The common carotid arteries, internal carotid   arteries and external carotid arteries are patent bilaterally without   evidence of stenosis or dissection..  There is minimal atherosclerotic   calcification at the right carotid bifurcation and mild atherosclerotic   calcification of the left carotid bifurcation.  No hemodynamically   significant carotid stenosis using NASCET criteria.    Vertebral arteries: The vertebral arteries are patent bilaterally without   evidence of atherosclerosis, stenosis or dissection.  The right vertebral   artery is dominant.  The left vertebral artery is noted to arise off the   proximal left subclavian artery within the superior mediastinum.    Soft tissues of the neck: There are punctate tonsilloliths in the   palatine tonsils.  A few tiny subcentimeter thyroid nodules are   visualized..    Cervical spine: Straightening of the cervical lordosis with mild   degenerative changes.  Small disc osteophyte complexes at C4-C5, C5-C6   and C6-C7.Mild spinal canal stenosis at C4-C5 and six.    Uncovertebral/facet hypertrophy causes mild right-sided neural foramina   at C3-C4; and causes moderate bilateral neural foraminal narrowing at   C4-C5, C5-C6 and C6-C7.    Visualized upper chest:  Secretions in the trachea.  Mild pleural   parenchymal scarring in the lung apices.  Punctate calcified granuloma in   the right upper lobe (6:594).  Trace dependent atelectasis partially   visualized in the right lung.    CT ANGIOGRAPHY BRAIN:  Internal carotid arteries: There is mild atherosclerotic calcification in   the distal precavernous segment of the left internal carotid artery and   in the cavernous and clinoid/proximal cervical carotid segments of both   internal carotid arteries.  No, flow-limiting stenosis or aneurysm.    Anterior cerebral arteries: No occlusion, flow-limiting stenosis or   aneurysm.  The A1 segment of the right anterior cerebral artery is   hypoplastic and the right A2 segment primarily fills across the anterior   communicating artery.    Middle cerebral arteries: No occlusion, flow-limiting stenosis or   aneurysm.    Anterior communicating artery: Patent.  No aneurysm.  Right posterior communicating artery: Patent.  No aneurysm  Left posterior communicating artery: Patent.  No aneurysm    Posterior cerebral arteries: There are moderate stenoses in the proximal   P2 segments of both posterior cerebral arteries with good distal flow   (7:40).  No occlusion or aneurysm.    Vertebrobasilar: No occlusion, flow-limiting stenosis or aneurysm.  The   distal right vertebral artery is dominant..  Bilateral posterior inferior   cerebellar arteries, a left anterior inferior cerebellar artery and   bilateral superior cerebellar arteries are visualized.  The left   posterior inferior cerebellar artery is noted to arise off the proximal   basilar artery.    Venous sinuses: The superficial and deep venous systems are patent.    Brain: No abnormal early arterial phase enhancement on CTA images.  No   evidence of a large arteriovenous malformation.    IMPRESSION:    CT PERFUSION: No core infarct or penumbra of ischemic tissue is   identified by CT perfusion.    CT ANGIOGRAPHY NECK:  1.  The cervical carotid systems and vertebral arteries are patent   bilaterally without evidence of stenosis or dissection.  2.  There is mild atherosclerotic calcification at the left carotid   bifurcation and minimal atherosclerotic calcification the right carotid   bifurcation.  No hemodynamically significant carotid stenosis using   NASCET criteria.  3.  Secretions in the trachea.    CT ANGIOGRAPHY BRAIN:  1. No major vessel occlusion or aneurysm is identified about the [place].  2.  There are atherosclerotic calcifications in the intracranial internal   carotidarteries without flow-limiting stenosis.  3.  There are moderate stenoses in the proximal P2 segments of both   posterior cerebral arteries with good distal flow.  4.  No evidence of dural venous sinus thrombosis or an arteriovenous   malformation.    < end of copied text >      RADIOLOGY & ADDITIONAL STUDIES: reviewed  ADVANCED DIRECTIVES: MOLST: DNR/DNI/no feeding tube/comfort only/DNH

## 2024-02-20 NOTE — CONSULT NOTE ADULT - CONSULT REASON
Altered Mental status
UTI
Discuss complex medical decision making related to goals of care due to advanced dementia

## 2024-02-20 NOTE — CHART NOTE - NSCHARTNOTESELECT_GEN_ALL_CORE
Event Note
infectious diseases/Off Service Note
elevated blood pressure/Event Note
family update/Event Note

## 2024-02-20 NOTE — CONSULT NOTE ADULT - PROBLEM SELECTOR RECOMMENDATION 2
Clinical evidence indicates that the patient has Severe protein calorie malnutrition/ 3rd degree. Albumin 2.5.  BMI 16    In context of    Chronic Illness (>1 month)    Energy/Food intake <50% of estimated energy requirement >5 days  Weight loss: Moderate - severe (lbs lost recently)  Body Fat loss: Severe   (Cachexia, temporal wasting, contracted, muscle atrophy)  Muscle mass loss: Severe  (Skin failure/pressure ulcers)  Fluid Accumulation: Severe (Fluid overload, ascites, pleural effusions)   Strength: weakened severe (bedbound)    Recommend:   pleasure feeds as tolerated - aspiration precautions, careful hand-feeding, teaching to caregivers  nutritional supplements as tolerated, nutrition consult    Family do not want artificial nutrition/PEG

## 2024-02-20 NOTE — CHART NOTE - NSCHARTNOTEFT_GEN_A_CORE
EVENT: Notified by Nurse re: patient  /89     BRIEF HPI:     Pt seen at bedside offers no complaints. Denies headache, vision changes, or epigastric pain, weakness, chest pain, palpitations, shortness of breath, nausea/vomiting, diarrhea, lower extremity pain.     T(C): 36.7 (02-15-24 @ 21:30), Max: 37.1 (02-15-24 @ 07:51)  HR: 125 (02-15-24 @ 21:30) (74 - 125)  BP: 196/89 (02-15-24 @ 21:30) (110/78 - 196/89)  RR: 16 (02-15-24 @ 21:30) (14 - 18)  SpO2: 97% (02-15-24 @ 21:30) (93% - 100%)    FOCUSED EXAM:   General: pt alert and orientated x3, NAD  Cardio: S1S2, regular rate and rythym   Respiratory: clear to auscultation b/l, no wheezing/rales/ronchi appreciated  ext: venodynes in place; no calf tenderness, dtr's 2+ b/l    ASSESSMENT/PLAN:    PROBLEM: Elevated B/P Hypertension-   - Resume home med Amlodipine 10mg    - cont close monitor    FOLLOW-UP: Reassess B/P for effectiveness of above intervention
Spoke with spouse Triston [phone] updated pt condition and plan with neurology and ID recommendation. on antibiotic. plan for CA/CTA wit perfusion to r/o new stroke, He agrees plan of care and reports that she has no allergic reaction with IV contrast.   Pt to treat with baclofen prior CT. discussed with nurse for plan.
EVENT:   2/18/24, 8:30pm, patient with AMS had T - 101.2F, HR - 90, BP -172/64. WBC - 6.65. Blood culture x sets done on 2/14. Not on Abx.  HPI:        84 y/o female who is legally blind and totally dependent for ADLS with PMH of HTN, HLD, PE, PAD, CVA with residual dysphagia?. dementia (AAOx1-2 at baseline) who was sent by NH for altered mental status. Patient is usually interactive and talking but this morning patient was found to be lethargic and minimally interactive. Due to this patient was sent to the hospital. Patient was seen at bedside but patient is minimally responsive and only making grunting sounds to painful stimuli. No ROS could be obtained from the patient due to altered mental status.    (14 Feb 2024 21:33)  OBJECTIVE:  Vital Signs Last 24 Hrs  T(C): 36.6 (18 Feb 2024 21:35), Max: 38.4 (18 Feb 2024 20:29)  T(F): 97.8 (18 Feb 2024 21:35), Max: 101.2 (18 Feb 2024 20:29)  HR: 90 (18 Feb 2024 20:29) (88 - 90)  BP: 172/64 (18 Feb 2024 20:29) (130/74 - 172/64)  BP(mean): 97 (18 Feb 2024 20:29) (97 - 97)  RR: 17 (18 Feb 2024 20:29) (17 - 17)  SpO2: 94% (18 Feb 2024 20:29) (94% - 97%)    Parameters below as of 18 Feb 2024 20:29  Patient On (Oxygen Delivery Method): room air        FOCUSED PHYSICAL EXAM:    LABS:                        10.5   6.65  )-----------( 261      ( 18 Feb 2024 06:10 )             31.0     02-18    135  |  106  |  14  ----------------------------<  114<H>  3.7   |  24  |  0.74    Ca    9.4      18 Feb 2024 06:10      PLAN:   - Blood culture x 2sets, s. lactate, urinalysis, RVP.     FOLLOW UP / RESULT:   - Follow-up morning labs,   - Reassess patient VS,   - Continue supportive care.
Pt is going on Comfort measures and even antibiotics are going to be DCed hence will sign off.

## 2024-02-21 LAB
ANION GAP SERPL CALC-SCNC: 5 MMOL/L — SIGNIFICANT CHANGE UP (ref 5–17)
BUN SERPL-MCNC: 14 MG/DL — SIGNIFICANT CHANGE UP (ref 7–18)
CALCIUM SERPL-MCNC: 9.4 MG/DL — SIGNIFICANT CHANGE UP (ref 8.4–10.5)
CHLORIDE SERPL-SCNC: 108 MMOL/L — SIGNIFICANT CHANGE UP (ref 96–108)
CO2 SERPL-SCNC: 24 MMOL/L — SIGNIFICANT CHANGE UP (ref 22–31)
CREAT SERPL-MCNC: 0.71 MG/DL — SIGNIFICANT CHANGE UP (ref 0.5–1.3)
EGFR: 84 ML/MIN/1.73M2 — SIGNIFICANT CHANGE UP
GLUCOSE SERPL-MCNC: 127 MG/DL — HIGH (ref 70–99)
HCT VFR BLD CALC: 33 % — LOW (ref 34.5–45)
HGB BLD-MCNC: 10.6 G/DL — LOW (ref 11.5–15.5)
MCHC RBC-ENTMCNC: 28.8 PG — SIGNIFICANT CHANGE UP (ref 27–34)
MCHC RBC-ENTMCNC: 32.1 GM/DL — SIGNIFICANT CHANGE UP (ref 32–36)
MCV RBC AUTO: 89.7 FL — SIGNIFICANT CHANGE UP (ref 80–100)
NRBC # BLD: 0 /100 WBCS — SIGNIFICANT CHANGE UP (ref 0–0)
PLATELET # BLD AUTO: 286 K/UL — SIGNIFICANT CHANGE UP (ref 150–400)
POTASSIUM SERPL-MCNC: 3.5 MMOL/L — SIGNIFICANT CHANGE UP (ref 3.5–5.3)
POTASSIUM SERPL-SCNC: 3.5 MMOL/L — SIGNIFICANT CHANGE UP (ref 3.5–5.3)
PTH RELATED PROT SERPL-MCNC: <2 PMOL/L — SIGNIFICANT CHANGE UP
RBC # BLD: 3.68 M/UL — LOW (ref 3.8–5.2)
RBC # FLD: 13.5 % — SIGNIFICANT CHANGE UP (ref 10.3–14.5)
SODIUM SERPL-SCNC: 137 MMOL/L — SIGNIFICANT CHANGE UP (ref 135–145)
WBC # BLD: 7.85 K/UL — SIGNIFICANT CHANGE UP (ref 3.8–10.5)
WBC # FLD AUTO: 7.85 K/UL — SIGNIFICANT CHANGE UP (ref 3.8–10.5)

## 2024-02-21 RX ADMIN — Medication 1 APPLICATION(S): at 17:49

## 2024-02-21 RX ADMIN — Medication 1 SPRAY(S): at 06:12

## 2024-02-21 RX ADMIN — Medication 100 MILLIGRAM(S): at 13:21

## 2024-02-21 RX ADMIN — CEFTRIAXONE 100 MILLIGRAM(S): 500 INJECTION, POWDER, FOR SOLUTION INTRAMUSCULAR; INTRAVENOUS at 17:45

## 2024-02-21 RX ADMIN — Medication 1 TABLET(S): at 13:15

## 2024-02-21 RX ADMIN — Medication 1 APPLICATION(S): at 06:12

## 2024-02-21 RX ADMIN — ATORVASTATIN CALCIUM 20 MILLIGRAM(S): 80 TABLET, FILM COATED ORAL at 21:42

## 2024-02-21 RX ADMIN — Medication 1000 UNIT(S): at 13:15

## 2024-02-21 RX ADMIN — CLOPIDOGREL BISULFATE 75 MILLIGRAM(S): 75 TABLET, FILM COATED ORAL at 13:20

## 2024-02-21 RX ADMIN — POLYETHYLENE GLYCOL 3350 17 GRAM(S): 17 POWDER, FOR SOLUTION ORAL at 13:17

## 2024-02-21 RX ADMIN — Medication 100 MILLIGRAM(S): at 06:12

## 2024-02-21 RX ADMIN — Medication 100 MILLIGRAM(S): at 00:05

## 2024-02-21 RX ADMIN — Medication 1 MILLIGRAM(S): at 13:15

## 2024-02-21 RX ADMIN — Medication 81 MILLIGRAM(S): at 13:16

## 2024-02-21 RX ADMIN — AMLODIPINE BESYLATE 10 MILLIGRAM(S): 2.5 TABLET ORAL at 06:12

## 2024-02-21 RX ADMIN — ENOXAPARIN SODIUM 30 MILLIGRAM(S): 100 INJECTION SUBCUTANEOUS at 06:12

## 2024-02-21 RX ADMIN — Medication 1 SPRAY(S): at 17:49

## 2024-02-21 RX ADMIN — LATANOPROST 1 DROP(S): 0.05 SOLUTION/ DROPS OPHTHALMIC; TOPICAL at 21:41

## 2024-02-21 NOTE — PROGRESS NOTE ADULT - SUBJECTIVE AND OBJECTIVE BOX
Patient is a 84y old  Female who presents with a chief complaint of AMS. (2024 13:23)    PATIENT IS SEEN AND EXAMINED IN MEDICAL FLOOR.  POOJA [    ]    SHIRLEY [   ]      GT [   ]    ALLERGIES:  No Known Allergies      Daily     Daily Weight in k (2024 05:25)    VITALS:    Vital Signs Last 24 Hrs  T(C): 36.8 (2024 05:25), Max: 36.8 (2024 13:17)  T(F): 98.3 (2024 05:25), Max: 98.3 (2024 13:17)  HR: 108 (2024 05:25) (94 - 108)  BP: 150/68 (2024 05:25) (150/68 - 163/70)  BP(mean): --  RR: 17 (2024 05:25) (17 - 18)  SpO2: 96% (2024 05:25) (96% - 97%)    Parameters below as of 2024 05:25  Patient On (Oxygen Delivery Method): room air        LABS:    CBC Full  -  ( 2024 07:20 )  WBC Count : 7.85 K/uL  RBC Count : 3.68 M/uL  Hemoglobin : 10.6 g/dL  Hematocrit : 33.0 %  Platelet Count - Automated : 286 K/uL  Mean Cell Volume : 89.7 fl  Mean Cell Hemoglobin : 28.8 pg  Mean Cell Hemoglobin Concentration : 32.1 gm/dL  Auto Neutrophil # : x  Auto Lymphocyte # : x  Auto Monocyte # : x  Auto Eosinophil # : x  Auto Basophil # : x  Auto Neutrophil % : x  Auto Lymphocyte % : x  Auto Monocyte % : x  Auto Eosinophil % : x  Auto Basophil % : x      02-    137  |  108  |  14  ----------------------------<  127<H>  3.5   |  24  |  0.71    Ca    9.4      2024 07:20      CAPILLARY BLOOD GLUCOSE              Creatinine Trend: 0.71<--, 0.72<--, 0.77<--, 0.74<--, 0.70<--, 0.63<--  I&O's Summary    2024 07:01  -  2024 07:00  --------------------------------------------------------  IN: 0 mL / OUT: 870 mL / NET: -870 mL            .Blood Blood-Peripheral   @ 07:05   No growth at 24 hours  --  --      .Blood Blood-Peripheral   @ 07:00   No growth at 24 hours  --  --      Clean Catch Clean Catch (Midstream)   @ 17:43   >100,000 CFU/ml Escherichia coli  --  Escherichia coli      .Blood Blood-Peripheral   @ 16:45   No growth at 5 days  --  --      .Blood Blood-Peripheral   @ 16:30   No growth at 5 days  --  --          MEDICATIONS:    MEDICATIONS  (STANDING):  amLODIPine   Tablet 10 milliGRAM(s) Oral daily  aspirin  chewable 81 milliGRAM(s) Oral daily  atorvastatin 20 milliGRAM(s) Oral at bedtime  bacitracin   Ointment 1 Application(s) Topical two times a day  cefTRIAXone   IVPB 1000 milliGRAM(s) IV Intermittent every 24 hours  cholecalciferol 1000 Unit(s) Oral daily  clopidogrel Tablet 75 milliGRAM(s) Oral daily  dextrose 5% + sodium chloride 0.9%. 1000 milliLiter(s) (75 mL/Hr) IV Continuous <Continuous>  enoxaparin Injectable 30 milliGRAM(s) SubCutaneous every 24 hours  fluticasone propionate 50 MICROgram(s)/spray Nasal Spray 1 Spray(s) Both Nostrils two times a day  folic acid 1 milliGRAM(s) Oral daily  guaiFENesin Oral Liquid (Sugar-Free) 100 milliGRAM(s) Oral every 6 hours  latanoprost 0.005% Ophthalmic Solution 1 Drop(s) Both EYES at bedtime  multivitamin 1 Tablet(s) Oral daily  polyethylene glycol 3350 17 Gram(s) Oral daily  senna 2 Tablet(s) Oral at bedtime      MEDICATIONS  (PRN):  acetaminophen     Tablet .. 650 milliGRAM(s) Oral every 6 hours PRN Temp greater or equal to 38C (100.4F), Mild Pain (1 - 3)  ondansetron Injectable 4 milliGRAM(s) IV Push every 8 hours PRN Nausea and/or Vomiting      REVIEW OF SYSTEMS:                           ALL ROS DONE [ X   ]    CONSTITUTIONAL:  LETHARGIC [   ], FEVER [   ], UNRESPONSIVE [   ]  CVS:  CP  [   ], SOB, [   ], PALPITATIONS [   ], DIZZYNESS [   ]  RS: COUGH [   ], SPUTUM [   ]  GI: ABDOMINAL PAIN [   ], NAUSEA [   ], VOMITINGS [   ], DIARRHEA [   ], CONSTIPATION [   ]  :  DYSURIA [   ], NOCTURIA [   ], INCREASED FREQUENCY [   ], DRIBLING [   ],  SKELETAL: PAINFUL JOINTS [   ], SWOLLEN JOINTS [   ], NECK ACHE [   ], LOW BACK ACHE [   ],  SKIN : ULCERS [   ], RASH [   ], ITCHING [   ]  CNS: HEAD ACHE [   ], DOUBLE VISION [   ], BLURRED VISION [   ], AMS / CONFUSION [   ], SEIZURES [   ], WEAKNESS [   ],TINGLING / NUMBNESS [   ]      PHYSICAL EXAMINATION:  GENERAL APPEARANCE: NO DISTRESS  HEENT:  NO PALLOR, NO  JVD,  NO   NODES, NECK SUPPLE  CVS: S1 +, S2 +,   RS: AEEB,  OCCASIONAL  RALES +,   NO RONCHI  ABD: SOFT, NT, NO, BS +  EXT: NO PE  SKIN: WARM,   SKELETAL:  ROM ACCEPTABLE [ WHEN EXAMINER MOVES ARMS/LEGS, BUT IS NOT CONSISTENTLY PARTICIPATING IN EXAM - ?DUE TO DEMENTIA]  CNS:  AAO X 1    RADIOLOGY :    RADIOLOGY AND READINGS REVIEWED    ASSESSMENT :     Altered mental status    Hypertension    Hyperlipidemia    Dementia    Pulmonary embolism        PLAN:  HPI:  Patient is a 84 y/o F who is legally blind and totally dependent for ADLS with PMH of HTN, HLD, PE, PAD, CVA with residual dysphagia?. dementia (AAOx1-2 at baseline) who was sent by NH for altered mental status. Patient is usually interactive and talking but this morning patient was found to be lethargic and minimally interactive. Due to this patient was sent to the hospital. Patient was seen at bedside but patient is minimally responsive and only making grunting sounds to painful stimuli. No ROS could be obtained from the patient due to altered mental status.    (2024 21:33)    # FAMILY DISCUSSED W/ PALLIATIVE CARE TEAM - THEY WISH FOR GOC DNR/DNI/COMFORT CARE. SW TEAM WORKING WITH FAMILY TO ARRANGE FOR HOSPICE/LTP.    # [] CASE DISCUSSED AT LENGTH WITH PATIENT'S  JAREK AND FRIEND AT BEDSIDE. ALL QUESTIONS ANSWERED. DISCUSSED REGARDING PATIENT'S ADVANCED DEMENTIA, DECLINING MOBILITY, DECLINING NUTRITIONAL STATUS. DISCUSSED THAT PATIENT'S PROGNOSIS IS POOR.  FAMILY VERBALIZED UNDERSTANDING. REGARDING GOALS OF CARE FAMILY WOULD LIKE TO DISCUSS AMONGST THEMSELVES. PALLIATIVE CARE CONSULT.      # FEVER, ACUTE URTI, CORONAVIRUS  - PRN ANTITUSSIVE  - FLONASE  - PRN DUONEB  - CHEST PT  - IVF    # SEPSIS S/T UTI  - PLACED ON ROCEPHIN, F/U BCX [NGTD] AND UCX [ECOLI]  - ID CONSULT    # ACUTE ENCEPHALOPATHY ON CHRONIC DEMENTIA  - NOTED CT HEAD  - RECOMMENDED FOR REPEAT CTA HEAD AND NECK VS. MRA HEAD/NECK + MR BRAIN PER NEUROLOGY  - F/U PT EVAL  - F/U ST EVAL  - ASA + PLAVIX  - NOTED UTOX, ABG  - NEUROLOGY CONSULT    # CONSTIPATION, FECAL IMPACTION  - BOWEL REGIMEN  - PLANNED FOR ENEMA    # ONAH  - IMPROVED S/P IVF    # AMBULATORY DYSFUNCTION S/T OA, OP, HX OF ?CVA  - F/U PT EVAL    # VITAMIN D DEFICIENCY  - REPLETE WITH SUPPLEMENT    # SEVERE PROTEIN CALORIE MALNUTRITION  - TEMPORAL, SHOULDER + PELVIC GIRDLE WASTING  - SUPPLEMENTAL NUTRITION    # PATENT IS HIGH RISK FOR ASPIRATION DESPITE PRECAUTIONS IN PLACE    # ? HX OF CVA  # ? HX OF PE IN RLL IN  - ? UNTREATED DUE TO RECURRENT FALLS     # HTN  # HLD  # LEGALLY BLIND, GLAUCOMA  # GI AND DVT PPX     Patient is a 84y old  Female who presents with a chief complaint of AMS. (2024 13:23)    PATIENT IS SEEN AND EXAMINED IN MEDICAL FLOOR.      ALLERGIES:  No Known Allergies      Daily     Daily Weight in k (2024 05:25)    VITALS:    Vital Signs Last 24 Hrs  T(C): 36.8 (2024 05:25), Max: 36.8 (2024 13:17)  T(F): 98.3 (2024 05:25), Max: 98.3 (2024 13:17)  HR: 108 (2024 05:25) (94 - 108)  BP: 150/68 (2024 05:25) (150/68 - 163/70)  BP(mean): --  RR: 17 (2024 05:25) (17 - 18)  SpO2: 96% (2024 05:25) (96% - 97%)    Parameters below as of 2024 05:25  Patient On (Oxygen Delivery Method): room air        LABS:    CBC Full  -  ( 2024 07:20 )  WBC Count : 7.85 K/uL  RBC Count : 3.68 M/uL  Hemoglobin : 10.6 g/dL  Hematocrit : 33.0 %  Platelet Count - Automated : 286 K/uL  Mean Cell Volume : 89.7 fl  Mean Cell Hemoglobin : 28.8 pg  Mean Cell Hemoglobin Concentration : 32.1 gm/dL  Auto Neutrophil # : x  Auto Lymphocyte # : x  Auto Monocyte # : x  Auto Eosinophil # : x  Auto Basophil # : x  Auto Neutrophil % : x  Auto Lymphocyte % : x  Auto Monocyte % : x  Auto Eosinophil % : x  Auto Basophil % : x          137  |  108  |  14  ----------------------------<  127<H>  3.5   |  24  |  0.71    Ca    9.4      2024 07:20      CAPILLARY BLOOD GLUCOSE              Creatinine Trend: 0.71<--, 0.72<--, 0.77<--, 0.74<--, 0.70<--, 0.63<--  I&O's Summary    2024 07:01  -  2024 07:00  --------------------------------------------------------  IN: 0 mL / OUT: 870 mL / NET: -870 mL            .Blood Blood-Peripheral   @ 07:05   No growth at 24 hours  --  --      .Blood Blood-Peripheral   @ 07:00   No growth at 24 hours  --  --      Clean Catch Clean Catch (Midstream)   @ 17:43   >100,000 CFU/ml Escherichia coli  --  Escherichia coli      .Blood Blood-Peripheral   @ 16:45   No growth at 5 days  --  --      .Blood Blood-Peripheral   @ 16:30   No growth at 5 days  --  --          MEDICATIONS:    MEDICATIONS  (STANDING):  amLODIPine   Tablet 10 milliGRAM(s) Oral daily  aspirin  chewable 81 milliGRAM(s) Oral daily  atorvastatin 20 milliGRAM(s) Oral at bedtime  bacitracin   Ointment 1 Application(s) Topical two times a day  cefTRIAXone   IVPB 1000 milliGRAM(s) IV Intermittent every 24 hours  cholecalciferol 1000 Unit(s) Oral daily  clopidogrel Tablet 75 milliGRAM(s) Oral daily  dextrose 5% + sodium chloride 0.9%. 1000 milliLiter(s) (75 mL/Hr) IV Continuous <Continuous>  enoxaparin Injectable 30 milliGRAM(s) SubCutaneous every 24 hours  fluticasone propionate 50 MICROgram(s)/spray Nasal Spray 1 Spray(s) Both Nostrils two times a day  folic acid 1 milliGRAM(s) Oral daily  guaiFENesin Oral Liquid (Sugar-Free) 100 milliGRAM(s) Oral every 6 hours  latanoprost 0.005% Ophthalmic Solution 1 Drop(s) Both EYES at bedtime  multivitamin 1 Tablet(s) Oral daily  polyethylene glycol 3350 17 Gram(s) Oral daily  senna 2 Tablet(s) Oral at bedtime      MEDICATIONS  (PRN):  acetaminophen     Tablet .. 650 milliGRAM(s) Oral every 6 hours PRN Temp greater or equal to 38C (100.4F), Mild Pain (1 - 3)  ondansetron Injectable 4 milliGRAM(s) IV Push every 8 hours PRN Nausea and/or Vomiting      REVIEW OF SYSTEMS:                           ALL ROS DONE [ X   ]    CONSTITUTIONAL:  LETHARGIC [   ], FEVER [   ], UNRESPONSIVE [   ]  CVS:  CP  [   ], SOB, [   ], PALPITATIONS [   ], DIZZYNESS [   ]  RS: COUGH [   ], SPUTUM [   ]  GI: ABDOMINAL PAIN [   ], NAUSEA [   ], VOMITINGS [   ], DIARRHEA [   ], CONSTIPATION [   ]  :  DYSURIA [   ], NOCTURIA [   ], INCREASED FREQUENCY [   ], DRIBLING [   ],  SKELETAL: PAINFUL JOINTS [   ], SWOLLEN JOINTS [   ], NECK ACHE [   ], LOW BACK ACHE [   ],  SKIN : ULCERS [   ], RASH [   ], ITCHING [   ]  CNS: HEAD ACHE [   ], DOUBLE VISION [   ], BLURRED VISION [   ], AMS / CONFUSION [   ], SEIZURES [   ], WEAKNESS [   ],TINGLING / NUMBNESS [   ]      PHYSICAL EXAMINATION:  GENERAL APPEARANCE: NO DISTRESS  HEENT:  NO PALLOR, NO  JVD,  NO   NODES, NECK SUPPLE  CVS: S1 +, S2 +,   RS: AEEB,  OCCASIONAL  RALES +,   NO RONCHI  ABD: SOFT, NT, NO, BS +  EXT: NO PE  SKIN: WARM,   SKELETAL:  ROM ACCEPTABLE [ WHEN EXAMINER MOVES ARMS/LEGS, BUT IS NOT CONSISTENTLY PARTICIPATING IN EXAM - ?DUE TO DEMENTIA]  CNS:  AAO X 1    RADIOLOGY :    RADIOLOGY AND READINGS REVIEWED    ASSESSMENT :     Altered mental status    Hypertension    Hyperlipidemia    Dementia    Pulmonary embolism        PLAN:  HPI:  Patient is a 84 y/o F who is legally blind and totally dependent for ADLS with PMH of HTN, HLD, PE, PAD, CVA with residual dysphagia?. dementia (AAOx1-2 at baseline) who was sent by NH for altered mental status. Patient is usually interactive and talking but this morning patient was found to be lethargic and minimally interactive. Due to this patient was sent to the hospital. Patient was seen at bedside but patient is minimally responsive and only making grunting sounds to painful stimuli. No ROS could be obtained from the patient due to altered mental status.    (2024 21:33)    # FAMILY DISCUSSED W/ PALLIATIVE CARE TEAM - THEY WISH FOR C DNR/DNI/COMFORT CARE. SW TEAM WORKING WITH FAMILY TO ARRANGE FOR HOSPICE/LTP.    # [] CASE DISCUSSED AT LENGTH WITH PATIENT'S  JAREK AND FRIEND AT BEDSIDE. ALL QUESTIONS ANSWERED. DISCUSSED REGARDING PATIENT'S ADVANCED DEMENTIA, DECLINING MOBILITY, DECLINING NUTRITIONAL STATUS. DISCUSSED THAT PATIENT'S PROGNOSIS IS POOR.  FAMILY VERBALIZED UNDERSTANDING. REGARDING GOALS OF CARE FAMILY WOULD LIKE TO DISCUSS AMONGST THEMSELVES. PALLIATIVE CARE CONSULT.      # FEVER, ACUTE URTI, CORONAVIRUS  - PRN ANTITUSSIVE  - FLONASE  - PRN DUONEB  - CHEST PT  - IVF    # SEPSIS S/T UTI  - PLACED ON ROCEPHIN, F/U BCX [NGTD] AND UCX [ECOLI]  - ID CONSULT    # ACUTE ENCEPHALOPATHY ON CHRONIC DEMENTIA  - NOTED CT HEAD  - RECOMMENDED FOR REPEAT CTA HEAD AND NECK VS. MRA HEAD/NECK + MR BRAIN PER NEUROLOGY  - F/U PT EVAL  - F/U ST EVAL  - ASA + PLAVIX  - NOTED UTOX, ABG  - NEUROLOGY CONSULT    # CONSTIPATION, FECAL IMPACTION  - BOWEL REGIMEN  - PLANNED FOR ENEMA    # NOAH  - IMPROVED S/P IVF    # AMBULATORY DYSFUNCTION S/T OA, OP, HX OF ?CVA  - F/U PT EVAL    # VITAMIN D DEFICIENCY  - REPLETE WITH SUPPLEMENT    # SEVERE PROTEIN CALORIE MALNUTRITION  - TEMPORAL, SHOULDER + PELVIC GIRDLE WASTING  - SUPPLEMENTAL NUTRITION    # PATENT IS HIGH RISK FOR ASPIRATION DESPITE PRECAUTIONS IN PLACE    # ? HX OF CVA  # ? HX OF PE IN RLL IN  - ? UNTREATED DUE TO RECURRENT FALLS     # HTN  # HLD  # LEGALLY BLIND, GLAUCOMA  # GI AND DVT PPX

## 2024-02-21 NOTE — PROGRESS NOTE ADULT - PROBLEM SELECTOR PLAN 1
- P/w lethargy and found to be minimally responsive.   - CT head: chronic ischemic changes with atrophy, most prevalent in the frontal temporal region.. No CT evidence of an acute infarct, hemorrhage, or mass.  - Likely AMS 2/2 to   - completed Ceftriaxone

## 2024-02-21 NOTE — PROGRESS NOTE ADULT - SUBJECTIVE AND OBJECTIVE BOX
NP Note discussed with  primary attending    Patient is a 84y old  Female who presents with a chief complaint of AMS. (21 Feb 2024 10:55)      INTERVAL HPI/OVERNIGHT EVENTS: no new complaints    MEDICATIONS  (STANDING):  amLODIPine   Tablet 10 milliGRAM(s) Oral daily  aspirin  chewable 81 milliGRAM(s) Oral daily  atorvastatin 20 milliGRAM(s) Oral at bedtime  bacitracin   Ointment 1 Application(s) Topical two times a day  cefTRIAXone   IVPB 1000 milliGRAM(s) IV Intermittent every 24 hours  cholecalciferol 1000 Unit(s) Oral daily  clopidogrel Tablet 75 milliGRAM(s) Oral daily  dextrose 5% + sodium chloride 0.9%. 1000 milliLiter(s) (75 mL/Hr) IV Continuous <Continuous>  enoxaparin Injectable 30 milliGRAM(s) SubCutaneous every 24 hours  fluticasone propionate 50 MICROgram(s)/spray Nasal Spray 1 Spray(s) Both Nostrils two times a day  folic acid 1 milliGRAM(s) Oral daily  guaiFENesin Oral Liquid (Sugar-Free) 100 milliGRAM(s) Oral every 6 hours  latanoprost 0.005% Ophthalmic Solution 1 Drop(s) Both EYES at bedtime  multivitamin 1 Tablet(s) Oral daily  polyethylene glycol 3350 17 Gram(s) Oral daily  senna 2 Tablet(s) Oral at bedtime    MEDICATIONS  (PRN):  acetaminophen     Tablet .. 650 milliGRAM(s) Oral every 6 hours PRN Temp greater or equal to 38C (100.4F), Mild Pain (1 - 3)  ondansetron Injectable 4 milliGRAM(s) IV Push every 8 hours PRN Nausea and/or Vomiting      __________________________________________________  REVIEW OF SYSTEMS:    CONSTITUTIONAL: No fever,     ALL OTHER  ROS negative        Vital Signs Last 24 Hrs  T(C): 36.7 (21 Feb 2024 13:00), Max: 36.8 (21 Feb 2024 05:25)  T(F): 98 (21 Feb 2024 13:00), Max: 98.3 (21 Feb 2024 05:25)  HR: 99 (21 Feb 2024 13:00) (94 - 108)  BP: 158/70 (21 Feb 2024 13:00) (150/68 - 158/70)  BP(mean): --  RR: 17 (21 Feb 2024 13:00) (17 - 18)  SpO2: 96% (21 Feb 2024 13:00) (96% - 97%)    Parameters below as of 21 Feb 2024 13:00  Patient On (Oxygen Delivery Method): room air        ________________________________________________  PHYSICAL EXAM:  GENERAL: lethargic, chronically ill appearing   CHEST/LUNG: decreased breath sounds calvin   HEART: S1 S2  regular;  ABDOMEN: Soft, Nontender, Nondistended; Bowel sounds present  EXTREMITIES: no cyanosis; no edema; no calf tenderness  SKIN: warm and dry; no rash  NERVOUS SYSTEM:  lethargic, does not follow commends     _________________________________________________  LABS:                        10.6   7.85  )-----------( 286      ( 21 Feb 2024 07:20 )             33.0     02-21    137  |  108  |  14  ----------------------------<  127<H>  3.5   |  24  |  0.71    Ca    9.4      21 Feb 2024 07:20        Urinalysis Basic - ( 21 Feb 2024 07:20 )    Color: x / Appearance: x / SG: x / pH: x  Gluc: 127 mg/dL / Ketone: x  / Bili: x / Urobili: x   Blood: x / Protein: x / Nitrite: x   Leuk Esterase: x / RBC: x / WBC x   Sq Epi: x / Non Sq Epi: x / Bacteria: x      CAPILLARY BLOOD GLUCOSE            RADIOLOGY & ADDITIONAL TESTS:    Imaging Personally Reviewed:  YES/NO    Consultant(s) Notes Reviewed:   YES/ No    Care Discussed with Consultants :     Plan of care was discussed with patient and /or primary care giver; all questions and concerns were addressed and care was aligned with patient's wishes.

## 2024-02-21 NOTE — PROGRESS NOTE ADULT - ASSESSMENT
82 y/o F who is legally blind and totally dependent for ADLS with PMH of HTN, HLD, PE, PAD, CVA with residual dysphagia?. dementia (AAOx1-2 at baseline) who was sent by NH for altered mental status. Patient tested positive for a UTI, and placed on IV abx. Patient is being admitted for acute encephalopathy 2/2 to UTI.  Ucx grew E coli. Treated with Ceftriaxone. ID and Neuro consulted. Neurology concerning possible  new left frontal ischemic infarct.  No IV tpa given because time of onset is unknown, reccs to start ASA/Plavix/statin. CTA of head and Brain perfusion scan negative for acute infarct. Palliative care consulted. Hospice appropriate.  PT evaluation recommended LTC/SNF, not a good candidate for rehab  Family agreed to focus on comfort measures only  DNR/DNI. MEWS exempt

## 2024-02-22 DIAGNOSIS — Z02.9 ENCOUNTER FOR ADMINISTRATIVE EXAMINATIONS, UNSPECIFIED: ICD-10-CM

## 2024-02-22 RX ORDER — BIMATOPROST 0.3 MG/ML
1 SOLUTION/ DROPS OPHTHALMIC
Refills: 0 | DISCHARGE

## 2024-02-22 RX ORDER — ASPIRIN/CALCIUM CARB/MAGNESIUM 324 MG
1 TABLET ORAL
Qty: 0 | Refills: 0 | DISCHARGE
Start: 2024-02-22

## 2024-02-22 RX ORDER — CHOLECALCIFEROL (VITAMIN D3) 125 MCG
1 CAPSULE ORAL
Refills: 0 | DISCHARGE

## 2024-02-22 RX ORDER — FOLIC ACID 0.8 MG
1 TABLET ORAL
Refills: 0 | DISCHARGE

## 2024-02-22 RX ORDER — CLOPIDOGREL BISULFATE 75 MG/1
1 TABLET, FILM COATED ORAL
Qty: 0 | Refills: 0 | DISCHARGE
Start: 2024-02-22

## 2024-02-22 RX ORDER — BRIMONIDINE TARTRATE, TIMOLOL MALEATE 2; 5 MG/ML; MG/ML
1 SOLUTION/ DROPS OPHTHALMIC
Refills: 0 | DISCHARGE

## 2024-02-22 RX ADMIN — Medication 1 APPLICATION(S): at 17:46

## 2024-02-22 RX ADMIN — POLYETHYLENE GLYCOL 3350 17 GRAM(S): 17 POWDER, FOR SOLUTION ORAL at 12:42

## 2024-02-22 RX ADMIN — ENOXAPARIN SODIUM 30 MILLIGRAM(S): 100 INJECTION SUBCUTANEOUS at 05:50

## 2024-02-22 RX ADMIN — LATANOPROST 1 DROP(S): 0.05 SOLUTION/ DROPS OPHTHALMIC; TOPICAL at 21:04

## 2024-02-22 RX ADMIN — Medication 81 MILLIGRAM(S): at 12:42

## 2024-02-22 RX ADMIN — Medication 1 MILLIGRAM(S): at 12:41

## 2024-02-22 RX ADMIN — CLOPIDOGREL BISULFATE 75 MILLIGRAM(S): 75 TABLET, FILM COATED ORAL at 12:41

## 2024-02-22 RX ADMIN — Medication 1 APPLICATION(S): at 05:50

## 2024-02-22 RX ADMIN — Medication 100 MILLIGRAM(S): at 05:52

## 2024-02-22 RX ADMIN — ATORVASTATIN CALCIUM 20 MILLIGRAM(S): 80 TABLET, FILM COATED ORAL at 21:04

## 2024-02-22 RX ADMIN — Medication 1 TABLET(S): at 12:42

## 2024-02-22 RX ADMIN — Medication 1000 UNIT(S): at 12:41

## 2024-02-22 RX ADMIN — Medication 100 MILLIGRAM(S): at 12:41

## 2024-02-22 RX ADMIN — SENNA PLUS 2 TABLET(S): 8.6 TABLET ORAL at 21:04

## 2024-02-22 RX ADMIN — Medication 1 SPRAY(S): at 17:46

## 2024-02-22 RX ADMIN — Medication 100 MILLIGRAM(S): at 17:47

## 2024-02-22 RX ADMIN — Medication 100 MILLIGRAM(S): at 23:35

## 2024-02-22 RX ADMIN — Medication 1 SPRAY(S): at 05:50

## 2024-02-22 RX ADMIN — AMLODIPINE BESYLATE 10 MILLIGRAM(S): 2.5 TABLET ORAL at 05:50

## 2024-02-22 NOTE — PROGRESS NOTE ADULT - PROBLEM SELECTOR PLAN 4
- Mildly elevated corrected calcium - 10.8.   - Start IV fluids.   - Vt D mildly low 28.1, normal IPTH   - Monitor Ca level
- Takes amlodipine at home.   - Will hold in setting of hypotension.   - Can resume once BP improves.
comfort measures only   MEWS suspended
comfort measures only   MEWS suspended

## 2024-02-22 NOTE — PROGRESS NOTE ADULT - PROBLEM SELECTOR PLAN 5
- Takes amlodipine at home.   - Will hold in setting of hypotension.   - Can resume once BP improves.
- Takes amlodipine at home.   - hold in setting of hypotension.
- C/w atorvastatin.
- Takes amlodipine at home.   - hold in setting of hypotension.

## 2024-02-22 NOTE — PROGRESS NOTE ADULT - ASSESSMENT
84 y/o F who is legally blind and totally dependent for ADLS with PMH of HTN, HLD, PE, PAD, CVA with residual dysphagia?. dementia (AAOx1-2 at baseline) who was sent by NH for altered mental status. Patient tested positive for a UTI, and placed on IV abx. Patient is being admitted for acute encephalopathy 2/2 to UTI.  Ucx grew E coli. Treated with Ceftriaxone. ID and Neuro consulted. Neurology concerning possible  new left frontal ischemic infarct.  No IV tpa given because time of onset is unknown, reccs to start ASA/Plavix/statin. CTA of head and Brain perfusion scan negative for acute infarct. Palliative care consulted. Hospice appropriate.  PT evaluation recommended LTC/SNF, not a good candidate for rehab  Family agreed to focus on comfort measures only  DNR/DNI. MEWS exempt

## 2024-02-22 NOTE — PROGRESS NOTE ADULT - PROBLEM SELECTOR PLAN 2
- P/w lethargy and found to be minimally responsive.   - Presented with hypotension (88/55) which resolved with IV fluids.   - UA +ve.   - S/p zosyn and vancomycin in ED.   - s/p 2.5L in ED.   - continue ceftriaxone.   - C/w IV fluids.   - F/U Ucx.   - F/U Bcx.  Wilburn catheter placed in ED- f/u TOV  ID - Dr Mejia
completed ceftriaxone
completed ceftriaxone
- P/w lethargy and found to be minimally responsive.   - Presented with hypotension (88/55) which resolved with IV fluids.   - UA +ve. Ucx Ecoli  - Bcx negative  - S/p zosyn and vancomycin in ED.   - s/p 2.5L in ED.   - Started ceftriaxone.   - C/w IV fluids.   - ID dr. Mejia consulted, f/u reccs

## 2024-02-22 NOTE — PROGRESS NOTE ADULT - NUTRITIONAL ASSESSMENT
This patient has been assessed with a concern for Malnutrition and has been determined to have a diagnosis/diagnoses of Severe protein-calorie malnutrition and Underweight (BMI < 19).    This patient is being managed with:   Diet Soft and Bite Sized-  DASH/TLC {Sodium & Cholesterol Restricted}  Supplement Feeding Modality:  Oral  Ensure Compact Cans or Servings Per Day:  1       Frequency:  Two Times a day  Entered: Feb 18 2024  7:56AM    Diet Soft and Bite Sized-  DASH/TLC {Sodium & Cholesterol Restricted}  Entered: Feb 15 2024  6:40PM    The following pending diet order is being considered for treatment of Severe protein-calorie malnutrition and Underweight (BMI < 19):null
This patient has been assessed with a concern for Malnutrition and has been determined to have a diagnosis/diagnoses of Severe protein-calorie malnutrition and Underweight (BMI < 19).    This patient is being managed with:   Diet Soft and Bite Sized-  DASH/TLC {Sodium & Cholesterol Restricted}  Supplement Feeding Modality:  Oral  Ensure Compact Cans or Servings Per Day:  1       Frequency:  Two Times a day  Entered: Feb 18 2024  7:56AM    Diet Soft and Bite Sized-  DASH/TLC {Sodium & Cholesterol Restricted}  Entered: Feb 15 2024  6:40PM    The following pending diet order is being considered for treatment of Severe protein-calorie malnutrition and Underweight (BMI < 19):null

## 2024-02-22 NOTE — PROGRESS NOTE ADULT - PROVIDER SPECIALTY LIST ADULT
Infectious Disease
Internal Medicine
Neurology
Internal Medicine

## 2024-02-22 NOTE — PROGRESS NOTE ADULT - PROBLEM SELECTOR PLAN 1
- P/w lethargy and found to be minimally responsive.   - CT head: chronic ischemic changes with atrophy, most prevalent in the frontal temporal region.. No CT evidence of an acute infarct, hemorrhage, or mass.  - Likely AMS 2/2 to   - completed Ceftriaxone  -LTC with hospice   -SW on board to assist with enragements

## 2024-02-22 NOTE — PROGRESS NOTE ADULT - PROBLEM SELECTOR PROBLEM 7
Order and last note sent to DME:  [facility] / ph [phone] / fax [phone]  
Prophylactic measure
Discharge planning issues

## 2024-02-22 NOTE — PROGRESS NOTE ADULT - PROBLEM SELECTOR PLAN 3
- Mildly elevated corrected calcium - 10.8.   continue IVF  - F/U PTH, PTHrp, Vitamin D.
-K 3.1  -replaced po supplement  -balanced diet, assist with feeding  -monitor BMP
pleasure feeds as tolerated - aspiration precautions, careful hand-feeding,
pleasure feeds as tolerated - aspiration precautions, careful hand-feeding,

## 2024-02-22 NOTE — PROGRESS NOTE ADULT - SUBJECTIVE AND OBJECTIVE BOX
Patient is a 84y old  Female who presents with a chief complaint of AMS. (21 Feb 2024 16:34)    PATIENT IS SEEN AND EXAMINED IN MEDICAL FLOOR.  POOJA [    ]    SHIRLEY [   ]      GT [   ]    ALLERGIES:  No Known Allergies      Daily     Daily     VITALS:    Vital Signs Last 24 Hrs  T(C): 37.1 (22 Feb 2024 05:20), Max: 37.1 (22 Feb 2024 05:20)  T(F): 98.7 (22 Feb 2024 05:20), Max: 98.7 (22 Feb 2024 05:20)  HR: 97 (22 Feb 2024 05:20) (97 - 99)  BP: 163/75 (22 Feb 2024 05:20) (142/69 - 163/75)  BP(mean): --  RR: 18 (22 Feb 2024 05:20) (17 - 18)  SpO2: 98% (22 Feb 2024 05:20) (96% - 98%)    Parameters below as of 22 Feb 2024 05:20  Patient On (Oxygen Delivery Method): room air        LABS:    CBC Full  -  ( 21 Feb 2024 07:20 )  WBC Count : 7.85 K/uL  RBC Count : 3.68 M/uL  Hemoglobin : 10.6 g/dL  Hematocrit : 33.0 %  Platelet Count - Automated : 286 K/uL  Mean Cell Volume : 89.7 fl  Mean Cell Hemoglobin : 28.8 pg  Mean Cell Hemoglobin Concentration : 32.1 gm/dL  Auto Neutrophil # : x  Auto Lymphocyte # : x  Auto Monocyte # : x  Auto Eosinophil # : x  Auto Basophil # : x  Auto Neutrophil % : x  Auto Lymphocyte % : x  Auto Monocyte % : x  Auto Eosinophil % : x  Auto Basophil % : x      02-21    137  |  108  |  14  ----------------------------<  127<H>  3.5   |  24  |  0.71    Ca    9.4      21 Feb 2024 07:20      CAPILLARY BLOOD GLUCOSE              Creatinine Trend: 0.71<--, 0.72<--, 0.77<--, 0.74<--, 0.70<--, 0.63<--  I&O's Summary    21 Feb 2024 07:01  -  22 Feb 2024 07:00  --------------------------------------------------------  IN: 0 mL / OUT: 100 mL / NET: -100 mL            .Blood Blood-Peripheral  02-19 @ 07:05   No growth at 48 Hours  --  --      .Blood Blood-Peripheral  02-19 @ 07:00   No growth at 48 Hours  --  --      Clean Catch Clean Catch (Midstream)  02-14 @ 17:43   >100,000 CFU/ml Escherichia coli  --  Escherichia coli      .Blood Blood-Peripheral  02-14 @ 16:45   No growth at 5 days  --  --      .Blood Blood-Peripheral  02-14 @ 16:30   No growth at 5 days  --  --          MEDICATIONS:    MEDICATIONS  (STANDING):  amLODIPine   Tablet 10 milliGRAM(s) Oral daily  aspirin  chewable 81 milliGRAM(s) Oral daily  atorvastatin 20 milliGRAM(s) Oral at bedtime  bacitracin   Ointment 1 Application(s) Topical two times a day  cholecalciferol 1000 Unit(s) Oral daily  clopidogrel Tablet 75 milliGRAM(s) Oral daily  dextrose 5% + sodium chloride 0.9%. 1000 milliLiter(s) (75 mL/Hr) IV Continuous <Continuous>  enoxaparin Injectable 30 milliGRAM(s) SubCutaneous every 24 hours  fluticasone propionate 50 MICROgram(s)/spray Nasal Spray 1 Spray(s) Both Nostrils two times a day  folic acid 1 milliGRAM(s) Oral daily  guaiFENesin Oral Liquid (Sugar-Free) 100 milliGRAM(s) Oral every 6 hours  latanoprost 0.005% Ophthalmic Solution 1 Drop(s) Both EYES at bedtime  multivitamin 1 Tablet(s) Oral daily  polyethylene glycol 3350 17 Gram(s) Oral daily  senna 2 Tablet(s) Oral at bedtime      MEDICATIONS  (PRN):  acetaminophen     Tablet .. 650 milliGRAM(s) Oral every 6 hours PRN Temp greater or equal to 38C (100.4F), Mild Pain (1 - 3)  ondansetron Injectable 4 milliGRAM(s) IV Push every 8 hours PRN Nausea and/or Vomiting      REVIEW OF SYSTEMS:                           ALL ROS DONE [ X   ]    CONSTITUTIONAL:  LETHARGIC [   ], FEVER [   ], UNRESPONSIVE [   ]  CVS:  CP  [   ], SOB, [   ], PALPITATIONS [   ], DIZZYNESS [   ]  RS: COUGH [   ], SPUTUM [   ]  GI: ABDOMINAL PAIN [   ], NAUSEA [   ], VOMITINGS [   ], DIARRHEA [   ], CONSTIPATION [   ]  :  DYSURIA [   ], NOCTURIA [   ], INCREASED FREQUENCY [   ], DRIBLING [   ],  SKELETAL: PAINFUL JOINTS [   ], SWOLLEN JOINTS [   ], NECK ACHE [   ], LOW BACK ACHE [   ],  SKIN : ULCERS [   ], RASH [   ], ITCHING [   ]  CNS: HEAD ACHE [   ], DOUBLE VISION [   ], BLURRED VISION [   ], AMS / CONFUSION [   ], SEIZURES [   ], WEAKNESS [   ],TINGLING / NUMBNESS [   ]      PHYSICAL EXAMINATION:  GENERAL APPEARANCE: NO DISTRESS  HEENT:  NO PALLOR, NO  JVD,  NO   NODES, NECK SUPPLE  CVS: S1 +, S2 +,   RS: AEEB,  OCCASIONAL  RALES +,   NO RONCHI  ABD: SOFT, NT, NO, BS +  EXT: NO PE  SKIN: WARM,   SKELETAL:  ROM ACCEPTABLE [ WHEN EXAMINER MOVES ARMS/LEGS, BUT IS NOT CONSISTENTLY PARTICIPATING IN EXAM - ?DUE TO DEMENTIA]  CNS:  AAO X 1    RADIOLOGY :    RADIOLOGY AND READINGS REVIEWED    ASSESSMENT :     Altered mental status    Hypertension    Hyperlipidemia    Dementia    Pulmonary embolism        PLAN:  HPI:  Patient is a 84 y/o F who is legally blind and totally dependent for ADLS with PMH of HTN, HLD, PE, PAD, CVA with residual dysphagia?. dementia (AAOx1-2 at baseline) who was sent by NH for altered mental status. Patient is usually interactive and talking but this morning patient was found to be lethargic and minimally interactive. Due to this patient was sent to the hospital. Patient was seen at bedside but patient is minimally responsive and only making grunting sounds to painful stimuli. No ROS could be obtained from the patient due to altered mental status.    (14 Feb 2024 21:33)    # FAMILY DISCUSSED W/ PALLIATIVE CARE TEAM - THEY WISH FOR [facility] DNR/DNI/COMFORT CARE. SW TEAM WORKING WITH FAMILY TO ARRANGE FOR HOSPICE/LTP.    # [2/18] CASE DISCUSSED AT LENGTH WITH PATIENT'S  JAREK AND FRIEND AT BEDSIDE. ALL QUESTIONS ANSWERED. DISCUSSED REGARDING PATIENT'S ADVANCED DEMENTIA, DECLINING MOBILITY, DECLINING NUTRITIONAL STATUS. DISCUSSED THAT PATIENT'S PROGNOSIS IS POOR.  FAMILY VERBALIZED UNDERSTANDING. REGARDING GOALS OF CARE FAMILY WOULD LIKE TO DISCUSS AMONGST THEMSELVES. PALLIATIVE CARE CONSULT.      # FEVER, ACUTE URTI, CORONAVIRUS  - PRN ANTITUSSIVE  - FLONASE  - PRN DUONEB  - CHEST PT  - IVF    # SEPSIS S/T UTI  - PLACED ON ROCEPHIN, F/U BCX [NGTD] AND UCX [ECOLI]  - ID CONSULT    # ACUTE ENCEPHALOPATHY ON CHRONIC DEMENTIA  - NOTED CT HEAD  - RECOMMENDED FOR REPEAT CTA HEAD AND NECK VS. MRA HEAD/NECK + MR BRAIN PER NEUROLOGY  - F/U PT EVAL  - F/U ST EVAL  - ASA + PLAVIX  - NOTED UTOX, ABG  - NEUROLOGY CONSULT    # CONSTIPATION, FECAL IMPACTION  - BOWEL REGIMEN  - PLANNED FOR ENEMA    # NOAH  - IMPROVED S/P IVF    # AMBULATORY DYSFUNCTION S/T OA, OP, HX OF ?CVA  - F/U PT EVAL    # VITAMIN D DEFICIENCY  - REPLETE WITH SUPPLEMENT    # SEVERE PROTEIN CALORIE MALNUTRITION  - TEMPORAL, SHOULDER + PELVIC GIRDLE WASTING  - SUPPLEMENTAL NUTRITION    # PATENT IS HIGH RISK FOR ASPIRATION DESPITE PRECAUTIONS IN PLACE    # ? HX OF CVA  # ? HX OF PE IN RLL IN 2022 - ? UNTREATED DUE TO RECURRENT FALLS     # HTN  # HLD  # LEGALLY BLIND, GLAUCOMA  # GI AND DVT PPX   Patient is a 84y old  Female who presents with a chief complaint of AMS. (21 Feb 2024 16:34)    PATIENT IS SEEN AND EXAMINED IN MEDICAL FLOOR.      ALLERGIES:  No Known Allergies      VITALS:    Vital Signs Last 24 Hrs  T(C): 37.1 (22 Feb 2024 05:20), Max: 37.1 (22 Feb 2024 05:20)  T(F): 98.7 (22 Feb 2024 05:20), Max: 98.7 (22 Feb 2024 05:20)  HR: 97 (22 Feb 2024 05:20) (97 - 99)  BP: 163/75 (22 Feb 2024 05:20) (142/69 - 163/75)  BP(mean): --  RR: 18 (22 Feb 2024 05:20) (17 - 18)  SpO2: 98% (22 Feb 2024 05:20) (96% - 98%)    Parameters below as of 22 Feb 2024 05:20  Patient On (Oxygen Delivery Method): room air        LABS:    CBC Full  -  ( 21 Feb 2024 07:20 )  WBC Count : 7.85 K/uL  RBC Count : 3.68 M/uL  Hemoglobin : 10.6 g/dL  Hematocrit : 33.0 %  Platelet Count - Automated : 286 K/uL  Mean Cell Volume : 89.7 fl  Mean Cell Hemoglobin : 28.8 pg  Mean Cell Hemoglobin Concentration : 32.1 gm/dL  Auto Neutrophil # : x  Auto Lymphocyte # : x  Auto Monocyte # : x  Auto Eosinophil # : x  Auto Basophil # : x  Auto Neutrophil % : x  Auto Lymphocyte % : x  Auto Monocyte % : x  Auto Eosinophil % : x  Auto Basophil % : x      02-21    137  |  108  |  14  ----------------------------<  127<H>  3.5   |  24  |  0.71    Ca    9.4      21 Feb 2024 07:20      CAPILLARY BLOOD GLUCOSE              Creatinine Trend: 0.71<--, 0.72<--, 0.77<--, 0.74<--, 0.70<--, 0.63<--  I&O's Summary    21 Feb 2024 07:01  -  22 Feb 2024 07:00  --------------------------------------------------------  IN: 0 mL / OUT: 100 mL / NET: -100 mL            .Blood Blood-Peripheral  02-19 @ 07:05   No growth at 48 Hours  --  --      .Blood Blood-Peripheral  02-19 @ 07:00   No growth at 48 Hours  --  --      Clean Catch Clean Catch (Midstream)  02-14 @ 17:43   >100,000 CFU/ml Escherichia coli  --  Escherichia coli      .Blood Blood-Peripheral  02-14 @ 16:45   No growth at 5 days  --  --      .Blood Blood-Peripheral  02-14 @ 16:30   No growth at 5 days  --  --          MEDICATIONS:    MEDICATIONS  (STANDING):  amLODIPine   Tablet 10 milliGRAM(s) Oral daily  aspirin  chewable 81 milliGRAM(s) Oral daily  atorvastatin 20 milliGRAM(s) Oral at bedtime  bacitracin   Ointment 1 Application(s) Topical two times a day  cholecalciferol 1000 Unit(s) Oral daily  clopidogrel Tablet 75 milliGRAM(s) Oral daily  dextrose 5% + sodium chloride 0.9%. 1000 milliLiter(s) (75 mL/Hr) IV Continuous <Continuous>  enoxaparin Injectable 30 milliGRAM(s) SubCutaneous every 24 hours  fluticasone propionate 50 MICROgram(s)/spray Nasal Spray 1 Spray(s) Both Nostrils two times a day  folic acid 1 milliGRAM(s) Oral daily  guaiFENesin Oral Liquid (Sugar-Free) 100 milliGRAM(s) Oral every 6 hours  latanoprost 0.005% Ophthalmic Solution 1 Drop(s) Both EYES at bedtime  multivitamin 1 Tablet(s) Oral daily  polyethylene glycol 3350 17 Gram(s) Oral daily  senna 2 Tablet(s) Oral at bedtime      MEDICATIONS  (PRN):  acetaminophen     Tablet .. 650 milliGRAM(s) Oral every 6 hours PRN Temp greater or equal to 38C (100.4F), Mild Pain (1 - 3)  ondansetron Injectable 4 milliGRAM(s) IV Push every 8 hours PRN Nausea and/or Vomiting      REVIEW OF SYSTEMS:                           ALL ROS DONE [ X   ]    CONSTITUTIONAL:  LETHARGIC [   ], FEVER [   ], UNRESPONSIVE [   ]  CVS:  CP  [   ], SOB, [   ], PALPITATIONS [   ], DIZZYNESS [   ]  RS: COUGH [   ], SPUTUM [   ]  GI: ABDOMINAL PAIN [   ], NAUSEA [   ], VOMITINGS [   ], DIARRHEA [   ], CONSTIPATION [   ]  :  DYSURIA [   ], NOCTURIA [   ], INCREASED FREQUENCY [   ], DRIBLING [   ],  SKELETAL: PAINFUL JOINTS [   ], SWOLLEN JOINTS [   ], NECK ACHE [   ], LOW BACK ACHE [   ],  SKIN : ULCERS [   ], RASH [   ], ITCHING [   ]  CNS: HEAD ACHE [   ], DOUBLE VISION [   ], BLURRED VISION [   ], AMS / CONFUSION [   ], SEIZURES [   ], WEAKNESS [   ],TINGLING / NUMBNESS [   ]      PHYSICAL EXAMINATION:  GENERAL APPEARANCE: NO DISTRESS  HEENT:  NO PALLOR, NO  JVD,  NO   NODES, NECK SUPPLE  CVS: S1 +, S2 +,   RS: AEEB,  OCCASIONAL  RALES +,   NO RONCHI  ABD: SOFT, NT, NO, BS +  EXT: NO PE  SKIN: WARM,   SKELETAL:  ROM ACCEPTABLE [ WHEN EXAMINER MOVES ARMS/LEGS, BUT IS NOT CONSISTENTLY PARTICIPATING IN EXAM - ?DUE TO DEMENTIA]  CNS:  AAO X 1    RADIOLOGY :    RADIOLOGY AND READINGS REVIEWED    ASSESSMENT :     Altered mental status    Hypertension    Hyperlipidemia    Dementia    Pulmonary embolism        PLAN:  HPI:  Patient is a 82 y/o F who is legally blind and totally dependent for ADLS with PMH of HTN, HLD, PE, PAD, CVA with residual dysphagia?. dementia (AAOx1-2 at baseline) who was sent by NH for altered mental status. Patient is usually interactive and talking but this morning patient was found to be lethargic and minimally interactive. Due to this patient was sent to the hospital. Patient was seen at bedside but patient is minimally responsive and only making grunting sounds to painful stimuli. No ROS could be obtained from the patient due to altered mental status.    (14 Feb 2024 21:33)    # FAMILY DISCUSSED W/ PALLIATIVE CARE TEAM - THEY WISH FOR [facility] DNR/DNI/COMFORT CARE.  TEAM WORKING WITH FAMILY TO ARRANGE FOR HOSPICE/LTP.    # [2/18] CASE DISCUSSED AT LENGTH WITH PATIENT'S  JAREK AND FRIEND AT BEDSIDE. ALL QUESTIONS ANSWERED. DISCUSSED REGARDING PATIENT'S ADVANCED DEMENTIA, DECLINING MOBILITY, DECLINING NUTRITIONAL STATUS. DISCUSSED THAT PATIENT'S PROGNOSIS IS POOR.  FAMILY VERBALIZED UNDERSTANDING. REGARDING GOALS OF CARE FAMILY WOULD LIKE TO DISCUSS AMONGST THEMSELVES. PALLIATIVE CARE CONSULT.      # FEVER, ACUTE URTI, CORONAVIRUS  - PRN ANTITUSSIVE  - FLONASE  - PRN DUONEB  - CHEST PT  - IVF    # SEPSIS S/T UTI  - PLACED ON ROCEPHIN, F/U BCX [NGTD] AND UCX [ECOLI]  - ID CONSULT    # ACUTE ENCEPHALOPATHY ON CHRONIC DEMENTIA  - NOTED CT HEAD  - RECOMMENDED FOR REPEAT CTA HEAD AND NECK VS. MRA HEAD/NECK + MR BRAIN PER NEUROLOGY  - F/U PT EVAL  - F/U ST EVAL  - ASA + PLAVIX  - NOTED UTOX, ABG  - NEUROLOGY CONSULT    # CONSTIPATION, FECAL IMPACTION  - BOWEL REGIMEN  - PLANNED FOR ENEMA    # NOAH  - IMPROVED S/P IVF    # AMBULATORY DYSFUNCTION S/T OA, OP, HX OF ?CVA  - F/U PT EVAL    # VITAMIN D DEFICIENCY  - REPLETE WITH SUPPLEMENT    # SEVERE PROTEIN CALORIE MALNUTRITION  - TEMPORAL, SHOULDER + PELVIC GIRDLE WASTING  - SUPPLEMENTAL NUTRITION    # PATENT IS HIGH RISK FOR ASPIRATION DESPITE PRECAUTIONS IN PLACE    # ? HX OF CVA  # ? HX OF PE IN RLL IN 2022 - ? UNTREATED DUE TO RECURRENT FALLS     # HTN  # HLD  # LEGALLY BLIND, GLAUCOMA  # GI AND DVT PPX

## 2024-02-22 NOTE — PROGRESS NOTE ADULT - SUBJECTIVE AND OBJECTIVE BOX
NP Note discussed with  primary attending    Patient is a 84y old  Female who presents with a chief complaint of AMS. (22 Feb 2024 10:58)      INTERVAL HPI/OVERNIGHT EVENTS: no new complaints    MEDICATIONS  (STANDING):  amLODIPine   Tablet 10 milliGRAM(s) Oral daily  aspirin  chewable 81 milliGRAM(s) Oral daily  atorvastatin 20 milliGRAM(s) Oral at bedtime  bacitracin   Ointment 1 Application(s) Topical two times a day  cholecalciferol 1000 Unit(s) Oral daily  clopidogrel Tablet 75 milliGRAM(s) Oral daily  dextrose 5% + sodium chloride 0.9%. 1000 milliLiter(s) (75 mL/Hr) IV Continuous <Continuous>  enoxaparin Injectable 30 milliGRAM(s) SubCutaneous every 24 hours  fluticasone propionate 50 MICROgram(s)/spray Nasal Spray 1 Spray(s) Both Nostrils two times a day  folic acid 1 milliGRAM(s) Oral daily  guaiFENesin Oral Liquid (Sugar-Free) 100 milliGRAM(s) Oral every 6 hours  latanoprost 0.005% Ophthalmic Solution 1 Drop(s) Both EYES at bedtime  multivitamin 1 Tablet(s) Oral daily  polyethylene glycol 3350 17 Gram(s) Oral daily  senna 2 Tablet(s) Oral at bedtime    MEDICATIONS  (PRN):  acetaminophen     Tablet .. 650 milliGRAM(s) Oral every 6 hours PRN Temp greater or equal to 38C (100.4F), Mild Pain (1 - 3)  ondansetron Injectable 4 milliGRAM(s) IV Push every 8 hours PRN Nausea and/or Vomiting      __________________________________________________  REVIEW OF SYSTEMS:    CONSTITUTIONAL: No fever,       Vital Signs Last 24 Hrs  T(C): 37.1 (22 Feb 2024 12:50), Max: 37.1 (22 Feb 2024 05:20)  T(F): 98.8 (22 Feb 2024 12:50), Max: 98.8 (22 Feb 2024 12:50)  HR: 100 (22 Feb 2024 12:50) (97 - 100)  BP: 147/67 (22 Feb 2024 12:50) (142/69 - 163/75)  BP(mean): 87 (22 Feb 2024 12:50) (87 - 87)  RR: 18 (22 Feb 2024 12:50) (18 - 18)  SpO2: 97% (22 Feb 2024 12:50) (97% - 98%)    Parameters below as of 22 Feb 2024 12:50  Patient On (Oxygen Delivery Method): room air        ________________________________________________  PHYSICAL EXAM:  GENERAL: NAD, lethargic   CHEST/LUNG: decreased breath sounds calvin   HEART: S1 S2  regular  ABDOMEN: Soft, Nontender, Nondistended;  EXTREMITIES: no cyanosis; no edema; no calf tenderness  SKIN: warm and dry; no rash  NERVOUS SYSTEM:  Awake, disoriented      _________________________________________________  LABS:                        10.6   7.85  )-----------( 286      ( 21 Feb 2024 07:20 )             33.0     02-21    137  |  108  |  14  ----------------------------<  127<H>  3.5   |  24  |  0.71    Ca    9.4      21 Feb 2024 07:20        Urinalysis Basic - ( 21 Feb 2024 07:20 )    Color: x / Appearance: x / SG: x / pH: x  Gluc: 127 mg/dL / Ketone: x  / Bili: x / Urobili: x   Blood: x / Protein: x / Nitrite: x   Leuk Esterase: x / RBC: x / WBC x   Sq Epi: x / Non Sq Epi: x / Bacteria: x      CAPILLARY BLOOD GLUCOSE            RADIOLOGY & ADDITIONAL TESTS:    Imaging Personally Reviewed:  YES/NO    Consultant(s) Notes Reviewed:   YES/ No    Care Discussed with Consultants :     Plan of care was discussed with patient and /or primary care giver; all questions and concerns were addressed and care was aligned with patient's wishes.

## 2024-02-23 VITALS
DIASTOLIC BLOOD PRESSURE: 72 MMHG | HEART RATE: 103 BPM | RESPIRATION RATE: 18 BRPM | TEMPERATURE: 98 F | SYSTOLIC BLOOD PRESSURE: 117 MMHG | OXYGEN SATURATION: 98 %

## 2024-02-23 PROCEDURE — 80053 COMPREHEN METABOLIC PANEL: CPT

## 2024-02-23 PROCEDURE — 96365 THER/PROPH/DIAG IV INF INIT: CPT

## 2024-02-23 PROCEDURE — 84295 ASSAY OF SERUM SODIUM: CPT

## 2024-02-23 PROCEDURE — 82306 VITAMIN D 25 HYDROXY: CPT

## 2024-02-23 PROCEDURE — 84132 ASSAY OF SERUM POTASSIUM: CPT

## 2024-02-23 PROCEDURE — 87186 SC STD MICRODIL/AGAR DIL: CPT

## 2024-02-23 PROCEDURE — 71045 X-RAY EXAM CHEST 1 VIEW: CPT

## 2024-02-23 PROCEDURE — 70496 CT ANGIOGRAPHY HEAD: CPT

## 2024-02-23 PROCEDURE — 85025 COMPLETE CBC W/AUTO DIFF WBC: CPT

## 2024-02-23 PROCEDURE — 82962 GLUCOSE BLOOD TEST: CPT

## 2024-02-23 PROCEDURE — 97163 PT EVAL HIGH COMPLEX 45 MIN: CPT

## 2024-02-23 PROCEDURE — 87040 BLOOD CULTURE FOR BACTERIA: CPT

## 2024-02-23 PROCEDURE — 87086 URINE CULTURE/COLONY COUNT: CPT

## 2024-02-23 PROCEDURE — 0042T: CPT

## 2024-02-23 PROCEDURE — 94640 AIRWAY INHALATION TREATMENT: CPT

## 2024-02-23 PROCEDURE — 70450 CT HEAD/BRAIN W/O DYE: CPT | Mod: MA

## 2024-02-23 PROCEDURE — 82652 VIT D 1 25-DIHYDROXY: CPT

## 2024-02-23 PROCEDURE — 82330 ASSAY OF CALCIUM: CPT

## 2024-02-23 PROCEDURE — 82310 ASSAY OF CALCIUM: CPT

## 2024-02-23 PROCEDURE — 81001 URINALYSIS AUTO W/SCOPE: CPT

## 2024-02-23 PROCEDURE — 36415 COLL VENOUS BLD VENIPUNCTURE: CPT

## 2024-02-23 PROCEDURE — 82803 BLOOD GASES ANY COMBINATION: CPT

## 2024-02-23 PROCEDURE — 85027 COMPLETE CBC AUTOMATED: CPT

## 2024-02-23 PROCEDURE — 83970 ASSAY OF PARATHORMONE: CPT

## 2024-02-23 PROCEDURE — 84100 ASSAY OF PHOSPHORUS: CPT

## 2024-02-23 PROCEDURE — 93005 ELECTROCARDIOGRAM TRACING: CPT

## 2024-02-23 PROCEDURE — 80307 DRUG TEST PRSMV CHEM ANLYZR: CPT

## 2024-02-23 PROCEDURE — 0225U NFCT DS DNA&RNA 21 SARSCOV2: CPT

## 2024-02-23 PROCEDURE — 83519 RIA NONANTIBODY: CPT

## 2024-02-23 PROCEDURE — 87637 SARSCOV2&INF A&B&RSV AMP PRB: CPT

## 2024-02-23 PROCEDURE — 0241U: CPT

## 2024-02-23 PROCEDURE — 84484 ASSAY OF TROPONIN QUANT: CPT

## 2024-02-23 PROCEDURE — 80048 BASIC METABOLIC PNL TOTAL CA: CPT

## 2024-02-23 PROCEDURE — 99285 EMERGENCY DEPT VISIT HI MDM: CPT

## 2024-02-23 PROCEDURE — 83605 ASSAY OF LACTIC ACID: CPT

## 2024-02-23 PROCEDURE — 83735 ASSAY OF MAGNESIUM: CPT

## 2024-02-23 PROCEDURE — 85730 THROMBOPLASTIN TIME PARTIAL: CPT

## 2024-02-23 PROCEDURE — 85610 PROTHROMBIN TIME: CPT

## 2024-02-23 PROCEDURE — 92610 EVALUATE SWALLOWING FUNCTION: CPT

## 2024-02-23 PROCEDURE — 96361 HYDRATE IV INFUSION ADD-ON: CPT

## 2024-02-23 PROCEDURE — 70498 CT ANGIOGRAPHY NECK: CPT

## 2024-02-23 PROCEDURE — 96366 THER/PROPH/DIAG IV INF ADDON: CPT

## 2024-02-23 PROCEDURE — 96367 TX/PROPH/DG ADDL SEQ IV INF: CPT

## 2024-02-23 RX ADMIN — AMLODIPINE BESYLATE 10 MILLIGRAM(S): 2.5 TABLET ORAL at 05:05

## 2024-02-23 RX ADMIN — ENOXAPARIN SODIUM 30 MILLIGRAM(S): 100 INJECTION SUBCUTANEOUS at 05:05

## 2024-02-23 RX ADMIN — Medication 1 MILLIGRAM(S): at 13:01

## 2024-02-23 RX ADMIN — Medication 1 SPRAY(S): at 05:01

## 2024-02-23 RX ADMIN — Medication 1 APPLICATION(S): at 05:03

## 2024-02-23 RX ADMIN — Medication 100 MILLIGRAM(S): at 05:02

## 2024-02-23 RX ADMIN — Medication 1000 UNIT(S): at 13:01

## 2024-02-23 RX ADMIN — CLOPIDOGREL BISULFATE 75 MILLIGRAM(S): 75 TABLET, FILM COATED ORAL at 13:01

## 2024-02-23 RX ADMIN — Medication 100 MILLIGRAM(S): at 13:00

## 2024-02-23 RX ADMIN — POLYETHYLENE GLYCOL 3350 17 GRAM(S): 17 POWDER, FOR SOLUTION ORAL at 13:01

## 2024-02-23 RX ADMIN — Medication 81 MILLIGRAM(S): at 13:01

## 2024-02-23 RX ADMIN — Medication 1 TABLET(S): at 13:01

## 2024-02-23 NOTE — DISCHARGE NOTE NURSING/CASE MANAGEMENT/SOCIAL WORK - NSDCPEFALRISK_GEN_ALL_CORE
For information on Fall & Injury Prevention, visit: [email or web address] OR  [email or web address] OR  https://www.cdc.gov/steadi/patient.html

## 2024-02-23 NOTE — DISCHARGE NOTE NURSING/CASE MANAGEMENT/SOCIAL WORK - PATIENT PORTAL LINK FT
You can access the FollowMyHealth Patient Portal offered by [facility] by registering at the following website: [email or web address]. By joining [facility]’s FollowMyHealth portal, you will also be able to view your health information using other applications (apps) compatible with our system.

## 2024-02-24 LAB
CULTURE RESULTS: SIGNIFICANT CHANGE UP
CULTURE RESULTS: SIGNIFICANT CHANGE UP
SPECIMEN SOURCE: SIGNIFICANT CHANGE UP
SPECIMEN SOURCE: SIGNIFICANT CHANGE UP

## 2024-06-26 NOTE — PATIENT PROFILE ADULT - NSPROPTRIGHTCAREGIVER_GEN_A_NUR
-- DO NOT REPLY / DO NOT REPLY ALL --  -- This inbox is not monitored  -- Message is from Engagement Center Operations (ECO) --    Offered Waitlist if Available for the Visit Type? Yes    Caller is requesting an appointment.    Reason for Appointment Message:  Caller wants sooner PC Acute appointment - offered other approved options (partner, ACW, ICC/UC, Quick Care Virtual Visit) and insists on PCP visit.    Reason for Visit: Follow up on medication     Is the patient currently scheduled? No    Preferred time to be seen: Tomorrow between 9:00-3:00     Caller Information         Type Contact Phone/Fax    06/26/2024 07:43 AM CDT Phone (Incoming) Miranda Bingham (Self) [phone] (M)            Alternative phone number: no    Can a detailed message be left?  Yes - Voicemail    Patient has been advised the message will be addressed within 2-3 business days        information could not be obtained